# Patient Record
Sex: MALE | Race: WHITE | NOT HISPANIC OR LATINO | Employment: FULL TIME | URBAN - METROPOLITAN AREA
[De-identification: names, ages, dates, MRNs, and addresses within clinical notes are randomized per-mention and may not be internally consistent; named-entity substitution may affect disease eponyms.]

---
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
## 2018-01-02 ENCOUNTER — HOSPITAL ENCOUNTER (EMERGENCY)
Facility: HOSPITAL | Age: 18
Discharge: HOME | End: 2018-01-02
Attending: EMERGENCY MEDICINE

## 2018-01-02 VITALS
SYSTOLIC BLOOD PRESSURE: 134 MMHG | HEART RATE: 70 BPM | TEMPERATURE: 98.4 F | DIASTOLIC BLOOD PRESSURE: 90 MMHG | RESPIRATION RATE: 16 BRPM | OXYGEN SATURATION: 98 %

## 2018-01-02 DIAGNOSIS — K02.9 DENTAL CARIES: ICD-10-CM

## 2018-01-02 DIAGNOSIS — K04.7 DENTAL INFECTION: Primary | ICD-10-CM

## 2018-01-02 PROCEDURE — 99282 EMERGENCY DEPT VISIT SF MDM: CPT

## 2018-01-02 RX ORDER — IBUPROFEN 600 MG/1
600 TABLET ORAL EVERY 6 HOURS PRN
Qty: 30 TABLET | Refills: 0 | Status: SHIPPED | OUTPATIENT
Start: 2018-01-02 | End: 2018-12-13 | Stop reason: ALTCHOICE

## 2018-01-02 RX ORDER — PENICILLIN V POTASSIUM 500 MG/1
500 TABLET ORAL 4 TIMES DAILY
Qty: 40 TABLET | Refills: 0 | Status: SHIPPED | OUTPATIENT
Start: 2018-01-02 | End: 2018-01-12

## 2018-01-02 RX ORDER — PENICILLIN V POTASSIUM 250 MG/1
500 TABLET ORAL ONCE
Status: COMPLETED | OUTPATIENT
Start: 2018-01-02 | End: 2018-01-02

## 2018-01-02 RX ORDER — IBUPROFEN 600 MG/1
600 TABLET ORAL ONCE
Status: COMPLETED | OUTPATIENT
Start: 2018-01-02 | End: 2018-01-02

## 2018-01-02 RX ADMIN — IBUPROFEN 600 MG: 600 TABLET ORAL at 15:31

## 2018-01-02 RX ADMIN — PENICILLIN V POTASIUM 500 MG: 250 TABLET ORAL at 15:31

## 2018-01-02 NOTE — DISCHARGE INSTRUCTIONS
Dental Abscess   WHAT YOU NEED TO KNOW:   A dental abscess is a collection of pus in or around a tooth  A dental abscess is caused by bacteria  The bacteria usually enter the tooth when the enamel (outer part of the tooth) is damaged by tooth decay  Bacteria may also enter the tooth through a break or chip in the tooth, or a cut in the gum  Food particles that are stuck between the teeth for a long time may also lead to an abscess  DISCHARGE INSTRUCTIONS:   Return to the emergency department if:   · You have severe pain  · You have trouble breathing because of pain or swelling  Contact your healthcare provider if:   · Your symptoms get worse, even after treatment  · Your mouth is bleeding  · You cannot eat or drink because of pain or swelling  · Your abscess returns  · You have an injury that causes a crack in your tooth  · You have questions or concerns about your condition or care  Medicines: You may  need any of the following:  · Antibiotics  help treat a bacterial infection  · NSAIDs , such as ibuprofen, help decrease swelling, pain, and fever  This medicine is available with or without a doctor's order  NSAIDs can cause stomach bleeding or kidney problems in certain people  If you take blood thinner medicine, always ask your healthcare provider if NSAIDs are safe for you  Always read the medicine label and follow directions  · Acetaminophen  decreases pain and fever  It is available without a doctor's order  Ask how much to take and how often to take it  Follow directions  Read the labels of all other medicines you are using to see if they also contain acetaminophen, or ask your doctor or pharmacist  Acetaminophen can cause liver damage if not taken correctly  Do not use more than 4 grams (4,000 milligrams) total of acetaminophen in one day  · Prescription pain medicine  may be given  Ask your healthcare provider how to take this medicine safely   Some prescription pain medicines contain acetaminophen  Do not take other medicines that contain acetaminophen without talking to your healthcare provider  Too much acetaminophen may cause liver damage  Prescription pain medicine may cause constipation  Ask your healthcare provider how to prevent or treat constipation  · Take your medicine as directed  Contact your healthcare provider if you think your medicine is not helping or if you have side effects  Tell him of her if you are allergic to any medicine  Keep a list of the medicines, vitamins, and herbs you take  Include the amounts, and when and why you take them  Bring the list or the pill bottles to follow-up visits  Carry your medicine list with you in case of an emergency  Self-care:   · Rinse your mouth every 2 hours with salt water  This will help keep the area clean  · Gently brush your teeth twice a day with a soft tooth brush  This will help keep the area clean  · Eat soft foods as directed  Soft foods may cause less pain  Examples include applesauce, yogurt, and cooked pasta  Ask your healthcare provider how long to follow this instruction  · Apply a warm compress to your tooth or gum  Use a cotton ball or gauze soaked in warm water  Remove the compress in 10 minutes or when it becomes cool  Repeat 3 times a day  Prevent another abscess:   · Brush your teeth at least 2 times a day with fluoride toothpaste  · Use dental floss to clean between your teeth at least once a day  · Rinse your mouth with water or mouthwash after meals and snacks  · Chew sugarless gum after meals and snacks  · Limit foods that are sticky and high in sugar such as raisons  Also limit drinks high in sugar, such as soda  · See your dentist every 6 months for dental cleanings and oral exams  Follow up with your healthcare provider in 24 hours: Your healthcare provider will need to check your teeth and gums   Write down your questions so you remember to ask them during your visits  © 2017 [address] Information is for End User's use only and may not be sold, redistributed or otherwise used for commercial purposes  All illustrations and images included in CareNotes® are the copyrighted property of [employer]  or [employer]  The above information is an  only  It is not intended as medical advice for individual conditions or treatments  Talk to your doctor, nurse or pharmacist before following any medical regimen to see if it is safe and effective for you  Dental Caries   WHAT YOU NEED TO KNOW:   Dental caries are also called cavities  Cavities are caused by bacteria  When the bacteria in tooth plaque (sticky film) mix with certain types of carbohydrate, this creates acid  The acid breaks down areas of enamel, which covers the outside of a tooth  This creates a small hole in the tooth called a cavity  DISCHARGE INSTRUCTIONS:   Return to the emergency department if:   · Your face, jaw, cheek, eye, or neck begin to swell  Contact your dentist if:   · You have a fever  · Your tooth pain gets worse  · You have questions or concerns about your condition or care  Follow up with your dentist as directed:  Write down your questions so you remember to ask them during your visits  Prevent dental caries:   · Brush your teeth at least 2 times a day with fluoride toothpaste  · Use dental floss to clean between your teeth at least once a day  · Rinse your mouth with water or mouthwash after meals and snacks  · Chew sugarless gum after meals and snacks  · See your dentist regularly for dental cleanings and oral exams  © 2017 [address] is for End User's use only and may not be sold, redistributed or otherwise used for commercial purposes  All illustrations and images included in CareNotes® are the copyrighted property of [employer]  or [employer]    The above information is an  only  It is not intended as medical advice for individual conditions or treatments  Talk to your doctor, nurse or pharmacist before following any medical regimen to see if it is safe and effective for you

## 2018-01-02 NOTE — ED PROVIDER NOTES
History  Chief Complaint   Patient presents with    Dental Swelling     Pt presents with facial swelling from dental issue, pt with issues with lower R teeth, pt denies pain     61-year-old male presents today complaining of right lower dental pain and facial swelling for 2 days  States I know it's my teeth there all right  Has not seen a dentist   Has not taken anything for pain  Denies fevers  Is having no trouble swallowing or handling secretions  History provided by:  Patient  Dental Pain   Location:  Lower  Quality:  Aching  Severity:  Moderate  Onset quality:  Gradual  Duration:  3 days  Timing:  Constant  Progression:  Worsening  Chronicity:  New  Context: dental caries    Relieved by:  None tried  Worsened by:  Nothing  Ineffective treatments:  None tried  Associated symptoms: facial swelling and gum swelling    Associated symptoms: no congestion, no difficulty swallowing, no drooling, no facial pain, no fever, no headaches, no neck pain, no neck swelling, no oral bleeding, no oral lesions and no trismus    Risk factors: lack of dental care and smoking    Risk factors: no alcohol problem, no cancer, no chewing tobacco use, no diabetes, no immunosuppression and no periodontal disease        Prior to Admission Medications   Prescriptions Last Dose Informant Patient Reported? Taking? morphine (MSIR) 30 MG tablet   No No   Sig: Take 1 tablet by mouth every 4 (four) hours as needed for severe pain for up to 5 doses Max Daily Amount: 180 mg      Facility-Administered Medications: None       Past Medical History:   Diagnosis Date    Asthma        Past Surgical History:   Procedure Laterality Date    CIRCUMCISION  2010       History reviewed  No pertinent family history  I have reviewed and agree with the history as documented      Social History   Substance Use Topics    Smoking status: Current Every Day Smoker     Packs/day: 0 20     Types: Cigarettes    Smokeless tobacco: Never Used   Narinder Brookings Alcohol use No        Review of Systems   Constitutional: Negative for fever  HENT: Positive for facial swelling  Negative for congestion, drooling and mouth sores  Musculoskeletal: Negative for neck pain  Skin: Negative for pallor, rash and wound  Neurological: Negative for headaches  Physical Exam  ED Triage Vitals [01/02/18 1418]   Temperature Pulse Respirations Blood Pressure SpO2   98 4 °F (36 9 °C) 69 18 136/64 100 %      Temp Source Heart Rate Source Patient Position - Orthostatic VS BP Location FiO2 (%)   Oral Monitor Sitting Left arm --      Pain Score       No Pain           Orthostatic Vital Signs  Vitals:    01/02/18 1418 01/02/18 1532   BP: 136/64 134/90   Pulse: 69 70   Patient Position - Orthostatic VS: Sitting Sitting       Physical Exam   Constitutional: He is oriented to person, place, and time  He appears well-developed and well-nourished  HENT:   Head: Normocephalic and atraumatic  Mouth/Throat: No uvula swelling  Extensive severe dental decay  Mild swelling adjacent to the left lower incisors on the buccal mucosa  No fluctuance c/w distinct abscess  Eyes: EOM are normal  Pupils are equal, round, and reactive to light  Neck: Normal range of motion  Neurological: He is alert and oriented to person, place, and time  Psychiatric: He has a normal mood and affect  Nursing note and vitals reviewed        ED Medications  Medications   penicillin V potassium (VEETID) tablet 500 mg (500 mg Oral Given 1/2/18 1531)   ibuprofen (MOTRIN) tablet 600 mg (600 mg Oral Given 1/2/18 1531)       Diagnostic Studies  Results Reviewed     None                 No orders to display              Procedures  Procedures       Phone Contacts  ED Phone Contact    ED Course  ED Course                                MDM  Number of Diagnoses or Management Options  Dental caries: new and does not require workup  Dental infection: new and does not require workup  Risk of Complications, Morbidity, and/or Mortality  Presenting problems: low  Diagnostic procedures: low  Management options: low    Patient Progress  Patient progress: stable    CritCare Time    Disposition  Final diagnoses:   Dental infection   Dental caries     Time reflects when diagnosis was documented in both MDM as applicable and the Disposition within this note     Time User Action Codes Description Comment    1/2/2018  3:16 PM Harjit Balderas Add [K04 7] Dental infection     1/2/2018  3:17 PM [place], [address] [place] [address] Road [K02 9] Dental caries       ED Disposition     ED Disposition Condition Comment    Discharge  [address] [facility] discharge to home/self care  Condition at discharge: Stable        Follow-up Information     Follow up With Specialties Details Why Contact Info    Randee Bower MD Pediatrics Schedule an appointment as soon as possible for a visit  [address] [phone]      [facility] Adult and [address] [facility]  Schedule an appointment as soon as possible for a visit  [address]  [address]  [phone]        Patient's Medications   Discharge Prescriptions    IBUPROFEN (MOTRIN) 600 MG TABLET    Take 1 tablet by mouth every 6 (six) hours as needed for mild pain       Start Date: 1/2/2018  End Date: --       Order Dose: 600 mg       Quantity: 30 tablet    Refills: 0    PENICILLIN V POTASSIUM (VEETID) 500 MG TABLET    Take 1 tablet by mouth 4 (four) times a day for 10 days       Start Date: 1/2/2018  End Date: 1/12/2018       Order Dose: 500 mg       Quantity: 40 tablet    Refills: 0     No discharge procedures on file      ED Provider  Electronically Signed by           Brian Conroy DO  01/02/18 1981

## 2018-01-02 NOTE — ED NOTES
D/c by SHNATI Whitmore MD  No s/s of acute distress off unit        Mignon Sandoval, HUSEYIN  01/02/18 0713

## 2018-01-16 ENCOUNTER — HOSPITAL ENCOUNTER (EMERGENCY)
Facility: HOSPITAL | Age: 18
Discharge: HOME | End: 2018-01-16
Attending: EMERGENCY MEDICINE

## 2018-01-16 VITALS
OXYGEN SATURATION: 99 % | DIASTOLIC BLOOD PRESSURE: 68 MMHG | HEART RATE: 65 BPM | RESPIRATION RATE: 20 BRPM | TEMPERATURE: 97.9 F | SYSTOLIC BLOOD PRESSURE: 136 MMHG

## 2018-01-16 DIAGNOSIS — R22.0 JAW SWELLING: Primary | ICD-10-CM

## 2018-01-16 PROCEDURE — 99283 EMERGENCY DEPT VISIT LOW MDM: CPT

## 2018-01-16 RX ORDER — NAPROXEN 500 MG/1
500 TABLET ORAL 2 TIMES DAILY WITH MEALS
Qty: 20 TABLET | Refills: 0 | Status: SHIPPED | OUTPATIENT
Start: 2018-01-16 | End: 2018-12-13 | Stop reason: ALTCHOICE

## 2018-01-16 RX ORDER — CLOVE OIL
OIL (ML) MISCELLANEOUS AS NEEDED
Qty: 3.5 ML | Refills: 0 | Status: SHIPPED | OUTPATIENT
Start: 2018-01-16 | End: 2018-12-13 | Stop reason: ALTCHOICE

## 2018-01-17 NOTE — ED PROVIDER NOTES
History  Chief Complaint   Patient presents with    Facial Swelling     Pt presents to the Ed for evaluation of right sided facial swelling, reports right sided tooth infection, pt unable to get in to dental clinic till march     Patient is a 68-year-old male with recent medical history of dental abscess presenting to the emergency department for evaluation of jaw swelling  Pt states he was seen about 2 weeks ago for a diagnosed dental abscess  HE was discharged on antibiotics and ibuprofen for analgesia  States the dental and facial pain has resolved  States that the facial swelling initially improved but has begun to return over the past 2 days  No facial or dental pain associated with it  No throat swelling or difficulty swallowing  States he attempted to call a dental clinic and they are unable to get him in before February  Pt states he wants to be evaluated to make sure the swelling isnt another infecitona nd if he could be provided with additional dental clinic information  No fever/chills/night sweats  No posterior neck pain or submandibular neck pain            Prior to Admission Medications   Prescriptions Last Dose Informant Patient Reported? Taking?   ibuprofen (MOTRIN) 600 mg tablet   No No   Sig: Take 1 tablet by mouth every 6 (six) hours as needed for mild pain   morphine (MSIR) 30 MG tablet   No No   Sig: Take 1 tablet by mouth every 4 (four) hours as needed for severe pain for up to 5 doses Max Daily Amount: 180 mg      Facility-Administered Medications: None       Past Medical History:   Diagnosis Date    Asthma        Past Surgical History:   Procedure Laterality Date    CIRCUMCISION  2010       History reviewed  No pertinent family history  I have reviewed and agree with the history as documented      Social History   Substance Use Topics    Smoking status: Current Every Day Smoker     Packs/day: 0 20     Types: Cigarettes    Smokeless tobacco: Never Used    Alcohol use No        Review of Systems   Constitutional: Negative for activity change, appetite change, chills, fatigue and fever  HENT: Positive for facial swelling  Negative for ear pain, sneezing and sore throat  Eyes: Negative for pain and visual disturbance  Respiratory: Negative for cough and shortness of breath  Cardiovascular: Negative for chest pain and palpitations  Gastrointestinal: Negative for abdominal pain, blood in stool, constipation, diarrhea, nausea and vomiting  Genitourinary: Negative for dysuria and hematuria  Musculoskeletal: Negative for arthralgias, neck pain and neck stiffness  Skin: Negative for rash and wound  Neurological: Negative for dizziness, weakness, light-headedness, numbness and headaches  All other systems reviewed and are negative  Physical Exam  ED Triage Vitals [01/16/18 1931]   Temperature Pulse Respirations Blood Pressure SpO2   97 9 °F (36 6 °C) 65 20 136/68 99 %      Temp Source Heart Rate Source Patient Position - Orthostatic VS BP Location FiO2 (%)   Oral Monitor Sitting Left arm --      Pain Score       No Pain           Orthostatic Vital Signs  Vitals:    01/16/18 1931   BP: 136/68   Pulse: 65   Patient Position - Orthostatic VS: Sitting       Physical Exam   Constitutional: He is oriented to person, place, and time  He appears well-developed and well-nourished  No distress  HENT:   Head: Normocephalic and atraumatic  Nose: Nose normal    External Swelling noted on anterior aspect of right lower jaw, near the first bicuspid  Nontender to palpation  No erythema noted  Posterior right three teeth are fracture and eroded done to the gingiva  Surrounding gingival erythema but nontender to palpation  Posterior oropharynx clear, no swelling  No posterior neck pain  No cervical LAD  Eyes: Conjunctivae and EOM are normal  Pupils are equal, round, and reactive to light  Neck: Normal range of motion  Neck supple     Cardiovascular: Normal rate, regular rhythm, normal heart sounds and intact distal pulses  Exam reveals no gallop and no friction rub  No murmur heard  Pulmonary/Chest: Effort normal and breath sounds normal  No respiratory distress  He has no wheezes  He has no rales  Abdominal: Soft  He exhibits no distension  There is no tenderness  Musculoskeletal: Normal range of motion  He exhibits no edema, tenderness or deformity  Lymphadenopathy:     He has no cervical adenopathy  Neurological: He is alert and oriented to person, place, and time  Skin: Skin is warm and dry  Capillary refill takes less than 2 seconds  He is not diaphoretic  No erythema  No pallor  Nursing note and vitals reviewed  ED Medications  Medications - No data to display    Diagnostic Studies  Results Reviewed     None                 No orders to display              Procedures  Procedures       Phone Contacts  ED Phone Contact    ED Course  ED Course                                MDM  Number of Diagnoses or Management Options  Jaw swelling: new and does not require workup  Diagnosis management comments: Pt presenting to the ED for evaluation of jaw swelling  Pt recently seen and diagnosed with dental abscess, placed on pen vk  Patient states that pain has resolved, however facial swelling returned 2 days ago  No pain associated with swelling  Swelling localized to jaw  I believe this swelling is secondary to fractured teeth and surrounding inflammation of gingiva  No signs of dental abscess at this time, patient denies pain  Patient declining dental block for pain, I believe patient is presenting for another referral to a different dental clinic as his appointment is not until next month  Will provide adela give patient strict return precautions for abscess symptoms       Risk of Complications, Morbidity, and/or Mortality  Presenting problems: low  Diagnostic procedures: minimal  Management options: minimal    Patient Progress  Patient progress: stable    CritCare Time    Disposition  Final diagnoses:   Jaw swelling     Time reflects when diagnosis was documented in both MDM as applicable and the Disposition within this note     Time User Action Codes Description Comment    1/16/2018 10:10 PM Dipika Millard Add [R22 0] Jaw swelling       ED Disposition     ED Disposition Condition Comment    Discharge  Scooter Bradley Repruddy discharge to home/self care  Condition at discharge: Stable        Follow-up Information     Follow up With Specialties Details Why [address] [place] [address]    [address] [facility] [address] [address]  [address]    [address]  [address] [place] [address], [place]    [address] [facility] Adult and [address] [facility] [address]    [phone] [facility] Emergency Department Emergency Medicine  If symptoms worsen [address] [facility] [address],  [address], [place], [place], [address]        Discharge Medication List as of 1/16/2018 10:40 PM      START taking these medications    Details   clove oil Apply topically as needed for mucositis, Starting Tue 1/16/2018, Print      naproxen (NAPROSYN) 500 mg tablet Take 1 tablet by mouth 2 (two) times a day with meals, Starting Tue 1/16/2018, Print         CONTINUE these medications which have NOT CHANGED    Details   ibuprofen (MOTRIN) 600 mg tablet Take 1 tablet by mouth every 6 (six) hours as needed for mild pain, Starting Tue 1/2/2018, Print      morphine (MSIR) 30 MG tablet Take 1 tablet by mouth every 4 (four) hours as needed for severe pain for up to 5 doses Max Daily Amount: 180 mg, Starting Fri 10/27/2017, Print           No discharge procedures on file      ED Provider  Electronically Signed by           Shell Midldeton PA-C  01/17/18 0466

## 2018-01-17 NOTE — DISCHARGE INSTRUCTIONS
Dental Abscess   WHAT YOU NEED TO KNOW:   A dental abscess is a collection of pus in or around a tooth  A dental abscess is caused by bacteria  The bacteria usually enter the tooth when the enamel (outer part of the tooth) is damaged by tooth decay  Bacteria may also enter the tooth through a break or chip in the tooth, or a cut in the gum  Food particles that are stuck between the teeth for a long time may also lead to an abscess  DISCHARGE INSTRUCTIONS:   Return to the emergency department if:   · You have severe pain  · You have trouble breathing because of pain or swelling  Contact your healthcare provider if:   · Your symptoms get worse, even after treatment  · Your mouth is bleeding  · You cannot eat or drink because of pain or swelling  · Your abscess returns  · You have an injury that causes a crack in your tooth  · You have questions or concerns about your condition or care  Medicines: You may  need any of the following:  · Antibiotics  help treat a bacterial infection  · NSAIDs , such as ibuprofen, help decrease swelling, pain, and fever  This medicine is available with or without a doctor's order  NSAIDs can cause stomach bleeding or kidney problems in certain people  If you take blood thinner medicine, always ask your healthcare provider if NSAIDs are safe for you  Always read the medicine label and follow directions  · Acetaminophen  decreases pain and fever  It is available without a doctor's order  Ask how much to take and how often to take it  Follow directions  Read the labels of all other medicines you are using to see if they also contain acetaminophen, or ask your doctor or pharmacist  Acetaminophen can cause liver damage if not taken correctly  Do not use more than 4 grams (4,000 milligrams) total of acetaminophen in one day  · Prescription pain medicine  may be given  Ask your healthcare provider how to take this medicine safely   Some prescription pain medicines contain acetaminophen  Do not take other medicines that contain acetaminophen without talking to your healthcare provider  Too much acetaminophen may cause liver damage  Prescription pain medicine may cause constipation  Ask your healthcare provider how to prevent or treat constipation  · Take your medicine as directed  Contact your healthcare provider if you think your medicine is not helping or if you have side effects  Tell him of her if you are allergic to any medicine  Keep a list of the medicines, vitamins, and herbs you take  Include the amounts, and when and why you take them  Bring the list or the pill bottles to follow-up visits  Carry your medicine list with you in case of an emergency  Self-care:   · Rinse your mouth every 2 hours with salt water  This will help keep the area clean  · Gently brush your teeth twice a day with a soft tooth brush  This will help keep the area clean  · Eat soft foods as directed  Soft foods may cause less pain  Examples include applesauce, yogurt, and cooked pasta  Ask your healthcare provider how long to follow this instruction  · Apply a warm compress to your tooth or gum  Use a cotton ball or gauze soaked in warm water  Remove the compress in 10 minutes or when it becomes cool  Repeat 3 times a day  Prevent another abscess:   · Brush your teeth at least 2 times a day with fluoride toothpaste  · Use dental floss to clean between your teeth at least once a day  · Rinse your mouth with water or mouthwash after meals and snacks  · Chew sugarless gum after meals and snacks  · Limit foods that are sticky and high in sugar such as raisons  Also limit drinks high in sugar, such as soda  · See your dentist every 6 months for dental cleanings and oral exams  Follow up with your healthcare provider in 24 hours: Your healthcare provider will need to check your teeth and gums   Write down your questions so you remember to ask them during your visits  © 2017 [facility] Information is for End User's use only and may not be sold, redistributed or otherwise used for commercial purposes  All illustrations and images included in CareNotes® are the copyrighted property of A D A M [employer] Inc  or Delfino Lua  The above information is an  only  It is not intended as medical advice for individual conditions or treatments  Talk to your doctor, nurse or pharmacist before following any medical regimen to see if it is safe and effective for you

## 2018-12-06 ENCOUNTER — APPOINTMENT (OUTPATIENT)
Dept: RADIOLOGY | Facility: CLINIC | Age: 18
End: 2018-12-06
Payer: OTHER MISCELLANEOUS

## 2018-12-06 ENCOUNTER — OFFICE VISIT (OUTPATIENT)
Dept: OBGYN CLINIC | Facility: CLINIC | Age: 18
End: 2018-12-06
Payer: OTHER MISCELLANEOUS

## 2018-12-06 VITALS
WEIGHT: 254 LBS | DIASTOLIC BLOOD PRESSURE: 82 MMHG | HEART RATE: 69 BPM | BODY MASS INDEX: 34.4 KG/M2 | HEIGHT: 72 IN | SYSTOLIC BLOOD PRESSURE: 135 MMHG

## 2018-12-06 DIAGNOSIS — M79.644 PAIN OF FINGER OF RIGHT HAND: ICD-10-CM

## 2018-12-06 DIAGNOSIS — M79.644 PAIN OF FINGER OF RIGHT HAND: Primary | ICD-10-CM

## 2018-12-06 DIAGNOSIS — S62.314S: ICD-10-CM

## 2018-12-06 PROCEDURE — 73130 X-RAY EXAM OF HAND: CPT

## 2018-12-06 PROCEDURE — 29125 APPL SHORT ARM SPLINT STATIC: CPT | Performed by: SURGERY

## 2018-12-06 PROCEDURE — 99203 OFFICE O/P NEW LOW 30 MIN: CPT | Performed by: SURGERY

## 2018-12-06 PROCEDURE — 29075 APPL CST ELBW FNGR SHORT ARM: CPT | Performed by: SURGERY

## 2018-12-06 NOTE — H&P
Rosalva LINO  Attending, Orthopaedic Surgery  Hand, Wrist, and Elbow Surgery  [facility]      ORTHOPAEDIC HAND, WRIST, AND ELBOW OFFICE  VISIT       ASSESSMENT/PLAN:      Patient will be scheduled for right 4th finger metacarpal open reduction  Patient was placed in a splint today  Follow up PO     The patient verbalized understanding of exam findings and treatment plan  We engaged in the shared decision-making process and treatment options were discussed at length with the patient  Surgical and conservative management discussed today along with risks and benefits  Diagnoses and all orders for this visit:    Pain of finger of right hand  -     Cancel: XR finger right fourth digit-ring; Future    Displaced fracture of base of fourth metacarpal bone, right hand, sequela  -     Splint application  -     Case request operating room: OPEN REDUCTION W/ INTERNAL FIXATION (ORIF) HAND, 4th metacarpal; Standing  -     Case request operating room: OPEN REDUCTION W/ INTERNAL FIXATION (ORIF) HAND, 4th metacarpal    Other orders  -     Void on call to OR; Standing  -     Insert peripheral IV; Standing  -     Diet NPO; Sips with meds; Standing        Follow Up:  No Follow-up on file  To Do Next Visit:  Re-evaluation of current issue and X-rays of the  right  hand      General Discussions:      Operative Discussions:  Fracture Operative Treatment: The physiology of a fractured bone was discussed with the patient today  With displaced fractures, operative treatment often results in a functional recovery  Typically, these fractures undergo reduction either through percutaneous or open methods depending on the location and fracture pattern  Radiographs are typically taken at intervals throughout the fracture healing ensure maintenance of reduction and alignment  If the fracture loses its alignment, revision surgery may be required    Medical conditions such as diabetes, osteoporosis, vitamin D deficiency, and a history of or exposure to smoking may delay or prevent fracture healing  The risks and benefits of the procedure were explained to the patient, which include, but are not limited to: Bleeding, infection, recurrence, pain, scar, malunion, nonunion, damage to tendons, damage to nerves, and damage to blood vessels, and complications related to anesthesia, failure to give desire result, need for more surgery  These risks, along with alternative conservative treatment options, and postoperative protocols were voiced back and understood by the patient  All questions were answered to the patient's satisfaction  The patient agrees to comply with a standard postoperative protocol, and is willing to proceed  Education was provided via written and auditory forms  There were no barriers to learning  Written handouts regarding wound care, incision and scar care, and general preoperative information was provided to the patient  Prior to surgery, the patient may be requested to stop all anti-inflammatory medications  Prophylactic aspirin, Plavix, and Coumadin may be allowed to be continued  Medications including vitamin E , ginkgo, and fish oil are requested to be stopped approximately one week prior to surgery  Hypertensive medications and beta blockers, if taken, should be continued  ____________________________________________________________________________________________________________________________________________      CHIEF COMPLAINT:  Chief Complaint   Patient presents with    Right Hand - Pain       SUBJECTIVE:  Juanita Coreas is a 19y o  year old RHD male who presents right hand pain  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 , and he removing metal sheet and when he was pushing the metal sheet his right ring  finger got stuck in the netting and the finger went  to the right   He notes he went to urgent care and was prescribed anti inflammatories and was placed in a splint and ace wrap  He notes there is some mild swelling,  pain in the right ring finger with movement  He denies any numbness tingling  He is currently not taking any medications  Pain/symptom timing:  Worse during the day when active  Pain/symptom context:  Worse with activites and work  Pain/symptom modifying factors:  Rest makes better, activities make worse  Pain/symptom associated signs/symptoms: none    Prior treatment   · NSAIDsYes   · Injections Yes and Not Asked   · Bracing/Orthotics Yes    Physical Therapy Yes     I have personally reviewed all the relevant PMH, PSH, SH, FH, Medications and allergies      PAST MEDICAL HISTORY:  Past Medical History:   Diagnosis Date    Asthma        PAST SURGICAL HISTORY:  Past Surgical History:   Procedure Laterality Date    CIRCUMCISION  2010       FAMILY HISTORY:  History reviewed  No pertinent family history  SOCIAL HISTORY:  Social History   Substance Use Topics    Smoking status: Current Every Day Smoker     Packs/day: 0 20     Types: Cigarettes    Smokeless tobacco: Never Used    Alcohol use No       MEDICATIONS:    Current Outpatient Prescriptions:     clove oil, Apply topically as needed for mucositis, Disp: 3 5 mL, Rfl: 0    ibuprofen (MOTRIN) 600 mg tablet, Take 1 tablet by mouth every 6 (six) hours as needed for mild pain, Disp: 30 tablet, Rfl: 0    morphine (MSIR) 30 MG tablet, Take 1 tablet by mouth every 4 (four) hours as needed for severe pain for up to 5 doses Max Daily Amount: 180 mg, Disp: 5 tablet, Rfl: 0    naproxen (NAPROSYN) 500 mg tablet, Take 1 tablet by mouth 2 (two) times a day with meals (Patient not taking: Reported on 12/6/2018 ), Disp: 20 tablet, Rfl: 0    ALLERGIES:  Allergies   Allergen Reactions    Seasonal Ic  [Cholestatin]            REVIEW OF SYSTEMS:  Review of Systems   Constitutional: Negative for activity change, chills, fever and unexpected weight change     HENT: Negative for trouble swallowing and voice change  Eyes: Negative for pain and visual disturbance  Respiratory: Negative for shortness of breath and wheezing  Cardiovascular: Negative for chest pain, palpitations and leg swelling  Gastrointestinal: Negative for abdominal pain, nausea and vomiting  Endocrine: Negative for polydipsia and polyuria  Genitourinary: Negative for dysuria and hematuria  Musculoskeletal: Positive for arthralgias  Negative for myalgias  Skin: Negative for rash and wound  Neurological: Negative for dizziness, numbness and headaches  Psychiatric/Behavioral: Negative for decreased concentration and suicidal ideas  The patient is not nervous/anxious  VITALS:  Vitals:    12/06/18 0852   BP: 135/82   Pulse: 69       LABS:  HgA1c: No results found for: HGBA1C  BMP: No results found for: GLUCOSE, CALCIUM, NA, K, CO2, CL, BUN, CREATININE    _____________________________________________________  PHYSICAL EXAMINATION:  General: well developed and well nourished, alert, oriented times 3 and appears comfortable  Psychiatric: Normal  HEENT: Normocephalic, Atraumatic Trachea Midline, No torticollis  Pulmonary: No audible wheezing or respiratory distress   Cardiovascular: No pitting edema, 2+ radial pulse   Skin: No masses, erthema, lacerations, fluctation, ulcerations  Neurovascular: Sensation Intact to the Median, Ulnar, Radial Nerve, Motor Intact to the Median, Ulnar, Radial Nerve and Pulses Intact  Musculoskeletal: Normal, except as noted in detailed exam and in HPI        MUSCULOSKELETAL EXAMINATION:  Right hand  Mild swelling   For shortened metacarpal  Extension lag at the MP joint of approximately 20°  Able to make a fist    Tenderness palpation at fracture site no malrotation noted    ___________________________________________________  STUDIES REVIEWED:  I have personally reviewed AP lateral and oblique radiographs of right hand   which demonstrates oblique fracture 4th metacarpal metaphysis extending into the base          PROCEDURES PERFORMED:  Splint application  Date/Time: 12/6/2018 9:43 AM  Performed by: Raquel Garcia by: Surendra Arnold     Consent:     Consent obtained:  Verbal    Consent given by:  Patient  Procedure details:     Laterality:  Right    Location:  Hand    Hand:  R hand    Strapping: no  Cast type:  Short arm    Splint type:  Short arm splint, static (forearm to hand)    Supplies:  Cotton padding, plaster and elastic bandage          _____________________________________________________      Crista Stauffer    I,:   Sherly Flores am acting as a scribe while in the presence of the attending physician :        I,:   Monae Zuleta MD personally performed the services described in this documentation    as scribed in my presence :                    Attestation signed by Monae Zuleta MD at 12/6/2018 11:36 AM:  I saw and examined the patient personally and agree with my physician extender's note and plan   I formulated  the plan and gave  explicit instructions to the patient

## 2018-12-06 NOTE — PROGRESS NOTES
Tania LINO  Attending, Orthopaedic Surgery  Hand, Wrist, and Elbow Surgery  [facility]      ORTHOPAEDIC HAND, WRIST, AND ELBOW OFFICE  VISIT       ASSESSMENT/PLAN:      Patient will be scheduled for right 4th finger metacarpal open reduction  Patient was placed in a splint today  Follow up PO     The patient verbalized understanding of exam findings and treatment plan  We engaged in the shared decision-making process and treatment options were discussed at length with the patient  Surgical and conservative management discussed today along with risks and benefits  Diagnoses and all orders for this visit:    Pain of finger of right hand  -     Cancel: XR finger right fourth digit-ring; Future    Displaced fracture of base of fourth metacarpal bone, right hand, sequela  -     Splint application  -     Case request operating room: OPEN REDUCTION W/ INTERNAL FIXATION (ORIF) HAND, 4th metacarpal; Standing  -     Case request operating room: OPEN REDUCTION W/ INTERNAL FIXATION (ORIF) HAND, 4th metacarpal    Other orders  -     Void on call to OR; Standing  -     Insert peripheral IV; Standing  -     Diet NPO; Sips with meds; Standing        Follow Up:  No Follow-up on file  To Do Next Visit:  Re-evaluation of current issue and X-rays of the  right  hand      General Discussions:      Operative Discussions:  Fracture Operative Treatment: The physiology of a fractured bone was discussed with the patient today  With displaced fractures, operative treatment often results in a functional recovery  Typically, these fractures undergo reduction either through percutaneous or open methods depending on the location and fracture pattern  Radiographs are typically taken at intervals throughout the fracture healing ensure maintenance of reduction and alignment  If the fracture loses its alignment, revision surgery may be required    Medical conditions such as diabetes, osteoporosis, vitamin D deficiency, and a history of or exposure to smoking may delay or prevent fracture healing  The risks and benefits of the procedure were explained to the patient, which include, but are not limited to: Bleeding, infection, recurrence, pain, scar, malunion, nonunion, damage to tendons, damage to nerves, and damage to blood vessels, and complications related to anesthesia, failure to give desire result, need for more surgery  These risks, along with alternative conservative treatment options, and postoperative protocols were voiced back and understood by the patient  All questions were answered to the patient's satisfaction  The patient agrees to comply with a standard postoperative protocol, and is willing to proceed  Education was provided via written and auditory forms  There were no barriers to learning  Written handouts regarding wound care, incision and scar care, and general preoperative information was provided to the patient  Prior to surgery, the patient may be requested to stop all anti-inflammatory medications  Prophylactic aspirin, Plavix, and Coumadin may be allowed to be continued  Medications including vitamin E , ginkgo, and fish oil are requested to be stopped approximately one week prior to surgery  Hypertensive medications and beta blockers, if taken, should be continued  ____________________________________________________________________________________________________________________________________________      CHIEF COMPLAINT:  Chief Complaint   Patient presents with    Right Hand - Pain       SUBJECTIVE:  Jona Alberts is a 19y o  year old RHD male who presents right hand pain  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 , and he removing metal sheet and when he was pushing the metal sheet his right ring  finger got stuck in the netting and the finger went  to the right   He notes he went to urgent care and was prescribed anti inflammatories and was placed in a splint and ace wrap  He notes there is some mild swelling,  pain in the right ring finger with movement  He denies any numbness tingling  He is currently not taking any medications  Pain/symptom timing:  Worse during the day when active  Pain/symptom context:  Worse with activites and work  Pain/symptom modifying factors:  Rest makes better, activities make worse  Pain/symptom associated signs/symptoms: none    Prior treatment   · NSAIDsYes   · Injections Yes and Not Asked   · Bracing/Orthotics Yes    Physical Therapy Yes     I have personally reviewed all the relevant PMH, PSH, SH, FH, Medications and allergies      PAST MEDICAL HISTORY:  Past Medical History:   Diagnosis Date    Asthma        PAST SURGICAL HISTORY:  Past Surgical History:   Procedure Laterality Date    CIRCUMCISION  2010       FAMILY HISTORY:  History reviewed  No pertinent family history  SOCIAL HISTORY:  Social History   Substance Use Topics    Smoking status: Current Every Day Smoker     Packs/day: 0 20     Types: Cigarettes    Smokeless tobacco: Never Used    Alcohol use No       MEDICATIONS:    Current Outpatient Prescriptions:     clove oil, Apply topically as needed for mucositis, Disp: 3 5 mL, Rfl: 0    ibuprofen (MOTRIN) 600 mg tablet, Take 1 tablet by mouth every 6 (six) hours as needed for mild pain, Disp: 30 tablet, Rfl: 0    morphine (MSIR) 30 MG tablet, Take 1 tablet by mouth every 4 (four) hours as needed for severe pain for up to 5 doses Max Daily Amount: 180 mg, Disp: 5 tablet, Rfl: 0    naproxen (NAPROSYN) 500 mg tablet, Take 1 tablet by mouth 2 (two) times a day with meals (Patient not taking: Reported on 12/6/2018 ), Disp: 20 tablet, Rfl: 0    ALLERGIES:  Allergies   Allergen Reactions    Seasonal Ic  [Cholestatin]            REVIEW OF SYSTEMS:  Review of Systems   Constitutional: Negative for activity change, chills, fever and unexpected weight change     HENT: Negative for trouble swallowing and voice change  Eyes: Negative for pain and visual disturbance  Respiratory: Negative for shortness of breath and wheezing  Cardiovascular: Negative for chest pain, palpitations and leg swelling  Gastrointestinal: Negative for abdominal pain, nausea and vomiting  Endocrine: Negative for polydipsia and polyuria  Genitourinary: Negative for dysuria and hematuria  Musculoskeletal: Positive for arthralgias  Negative for myalgias  Skin: Negative for rash and wound  Neurological: Negative for dizziness, numbness and headaches  Psychiatric/Behavioral: Negative for decreased concentration and suicidal ideas  The patient is not nervous/anxious  VITALS:  Vitals:    12/06/18 0852   BP: 135/82   Pulse: 69       LABS:  HgA1c: No results found for: HGBA1C  BMP: No results found for: GLUCOSE, CALCIUM, NA, K, CO2, CL, BUN, CREATININE    _____________________________________________________  PHYSICAL EXAMINATION:  General: well developed and well nourished, alert, oriented times 3 and appears comfortable  Psychiatric: Normal  HEENT: Normocephalic, Atraumatic Trachea Midline, No torticollis  Pulmonary: No audible wheezing or respiratory distress   Cardiovascular: No pitting edema, 2+ radial pulse   Skin: No masses, erthema, lacerations, fluctation, ulcerations  Neurovascular: Sensation Intact to the Median, Ulnar, Radial Nerve, Motor Intact to the Median, Ulnar, Radial Nerve and Pulses Intact  Musculoskeletal: Normal, except as noted in detailed exam and in HPI        MUSCULOSKELETAL EXAMINATION:  Right hand  Mild swelling   For shortened metacarpal  Extension lag at the MP joint of approximately 20°  Able to make a fist    Tenderness palpation at fracture site no malrotation noted    ___________________________________________________  STUDIES REVIEWED:  I have personally reviewed AP lateral and oblique radiographs of right hand   which demonstrates oblique fracture 4th metacarpal metaphysis extending into the base          PROCEDURES PERFORMED:  Splint application  Date/Time: 12/6/2018 9:43 AM  Performed by: Myrna Valderrama by: Shayy Ruvalcaba     Consent:     Consent obtained:  Verbal    Consent given by:  Patient  Procedure details:     Laterality:  Right    Location:  Hand    Hand:  R hand    Strapping: no  Cast type:  Short arm    Splint type:  Short arm splint, static (forearm to hand)    Supplies:  Cotton padding, plaster and elastic bandage          _____________________________________________________      Mona Harris    I,:   Yani Weber am acting as a scribe while in the presence of the attending physician :        I,:   Bull Oconnor MD personally performed the services described in this documentation    as scribed in my presence :

## 2018-12-13 ENCOUNTER — ANESTHESIA EVENT (OUTPATIENT)
Dept: PERIOP | Facility: HOSPITAL | Age: 18
End: 2018-12-13
Payer: OTHER MISCELLANEOUS

## 2018-12-13 DIAGNOSIS — S62.314S: Primary | ICD-10-CM

## 2018-12-13 RX ORDER — HYDROCODONE BITARTRATE AND ACETAMINOPHEN 5; 325 MG/1; MG/1
1 TABLET ORAL EVERY 6 HOURS PRN
Qty: 16 TABLET | Refills: 0 | Status: SHIPPED | OUTPATIENT
Start: 2018-12-13

## 2018-12-13 NOTE — ANESTHESIA PREPROCEDURE EVALUATION
Review of Systems/Medical History  Patient summary reviewed  Chart reviewed  No history of anesthetic complications     Cardiovascular   Pulmonary  Smoker ex-smoker  , Asthma , well controlled/ stable ,        GI/Hepatic  Negative GI/hepatic ROS          Negative  ROS        Endo/Other  Negative endo/other ROS   Obesity (BMI 33)    GYN  Negative gynecology ROS          Hematology   Musculoskeletal    Comment: Displaced fx 4th MC right hand      Neurology  Negative neurology ROS      Psychology   Negative psychology ROS              Physical Exam    Airway    Mallampati score: II  TM Distance: >3 FB       Dental   Comment: Poor dentition,decayed teeth,     Cardiovascular  Rhythm: regular,     Pulmonary  Breath sounds clear to auscultation,     Other Findings        Anesthesia Plan  ASA Score- 2     Anesthesia Type- IV sedation with anesthesia and regional with ASA Monitors  Additional Monitors:   Airway Plan:         Plan Factors-    Induction- intravenous  Postoperative Plan- Plan for postoperative opioid use  Informed Consent- Anesthetic plan and risks discussed with patient  I personally reviewed this patient with the CRNA  Discussed and agreed on the Anesthesia Plan with the EVANGELISTA Guerra

## 2018-12-14 ENCOUNTER — HOSPITAL ENCOUNTER (OUTPATIENT)
Dept: RADIOLOGY | Facility: HOSPITAL | Age: 18
Setting detail: OUTPATIENT SURGERY
Discharge: HOME | End: 2018-12-14
Payer: OTHER MISCELLANEOUS

## 2018-12-14 ENCOUNTER — HOSPITAL ENCOUNTER (OUTPATIENT)
Facility: HOSPITAL | Age: 18
Setting detail: OUTPATIENT SURGERY
Discharge: HOME | End: 2018-12-14
Attending: SURGERY | Admitting: SURGERY
Payer: OTHER MISCELLANEOUS

## 2018-12-14 ENCOUNTER — ANESTHESIA (OUTPATIENT)
Dept: PERIOP | Facility: HOSPITAL | Age: 18
End: 2018-12-14
Payer: OTHER MISCELLANEOUS

## 2018-12-14 VITALS
BODY MASS INDEX: 33.86 KG/M2 | DIASTOLIC BLOOD PRESSURE: 78 MMHG | WEIGHT: 250 LBS | HEIGHT: 72 IN | TEMPERATURE: 99.3 F | RESPIRATION RATE: 18 BRPM | HEART RATE: 88 BPM | OXYGEN SATURATION: 99 % | SYSTOLIC BLOOD PRESSURE: 126 MMHG

## 2018-12-14 VITALS — OXYGEN SATURATION: 99 % | SYSTOLIC BLOOD PRESSURE: 128 MMHG | DIASTOLIC BLOOD PRESSURE: 80 MMHG | HEART RATE: 88 BPM

## 2018-12-14 DIAGNOSIS — S62.314S: ICD-10-CM

## 2018-12-14 PROCEDURE — 73130 X-RAY EXAM OF HAND: CPT

## 2018-12-14 PROCEDURE — C1713 ANCHOR/SCREW BN/BN,TIS/BN: HCPCS | Performed by: SURGERY

## 2018-12-14 PROCEDURE — 26615 TREAT METACARPAL FRACTURE: CPT | Performed by: SURGERY

## 2018-12-14 DEVICE — 1.5MM VAL Y-PLATE 3 HOLES HD-7 HOLES SHAFT: Type: IMPLANTABLE DEVICE | Site: HAND | Status: FUNCTIONAL

## 2018-12-14 DEVICE — 1.5MM CORTEX SCREW SLF-TPNG WITH T4 STARDRIVE RECESS 11MM: Type: IMPLANTABLE DEVICE | Site: HAND | Status: FUNCTIONAL

## 2018-12-14 DEVICE — 1.5MM CORTEX SCREW SLF-TPNG WITH T4 STARDRIVE RECESS 9MM: Type: IMPLANTABLE DEVICE | Site: HAND | Status: FUNCTIONAL

## 2018-12-14 DEVICE — 1.5MM CORTEX SCREW SLF-TPNG WITH T4 STARDRIVE RECESS 10MM: Type: IMPLANTABLE DEVICE | Site: HAND | Status: FUNCTIONAL

## 2018-12-14 DEVICE — 1.5MM CORTEX SCREW SLF-TPNG WITH T4 STARDRIVE RECESS 12MM: Type: IMPLANTABLE DEVICE | Site: HAND | Status: FUNCTIONAL

## 2018-12-14 DEVICE — 1.5MM CORTEX SCREW SLF-TPNG WITH T4 STARDRIVE RECESS 14MM: Type: IMPLANTABLE DEVICE | Site: HAND | Status: FUNCTIONAL

## 2018-12-14 DEVICE — 1.3MM LOCKING SCREW SLF-TPNG WITH T4 STARDRIVE RECESS 8MM: Type: IMPLANTABLE DEVICE | Site: HAND | Status: FUNCTIONAL

## 2018-12-14 RX ORDER — FENTANYL CITRATE 50 UG/ML
INJECTION, SOLUTION INTRAMUSCULAR; INTRAVENOUS AS NEEDED
Status: DISCONTINUED | OUTPATIENT
Start: 2018-12-14 | End: 2018-12-14 | Stop reason: SURG

## 2018-12-14 RX ORDER — LIDOCAINE HYDROCHLORIDE 10 MG/ML
INJECTION, SOLUTION INFILTRATION; PERINEURAL AS NEEDED
Status: DISCONTINUED | OUTPATIENT
Start: 2018-12-14 | End: 2018-12-14 | Stop reason: SURG

## 2018-12-14 RX ORDER — FENTANYL CITRATE/PF 50 MCG/ML
50 SYRINGE (ML) INJECTION
Status: DISCONTINUED | OUTPATIENT
Start: 2018-12-14 | End: 2018-12-14 | Stop reason: HOSPADM

## 2018-12-14 RX ORDER — ONDANSETRON 2 MG/ML
4 INJECTION INTRAMUSCULAR; INTRAVENOUS ONCE AS NEEDED
Status: DISCONTINUED | OUTPATIENT
Start: 2018-12-14 | End: 2018-12-14 | Stop reason: HOSPADM

## 2018-12-14 RX ORDER — SODIUM CHLORIDE, SODIUM LACTATE, POTASSIUM CHLORIDE, CALCIUM CHLORIDE 600; 310; 30; 20 MG/100ML; MG/100ML; MG/100ML; MG/100ML
50 INJECTION, SOLUTION INTRAVENOUS CONTINUOUS
Status: DISCONTINUED | OUTPATIENT
Start: 2018-12-14 | End: 2018-12-14 | Stop reason: HOSPADM

## 2018-12-14 RX ORDER — LIDOCAINE HYDROCHLORIDE 10 MG/ML
0.5 INJECTION, SOLUTION INFILTRATION; PERINEURAL ONCE AS NEEDED
Status: COMPLETED | OUTPATIENT
Start: 2018-12-14 | End: 2018-12-14

## 2018-12-14 RX ORDER — MAGNESIUM HYDROXIDE 1200 MG/15ML
LIQUID ORAL AS NEEDED
Status: DISCONTINUED | OUTPATIENT
Start: 2018-12-14 | End: 2018-12-14 | Stop reason: HOSPADM

## 2018-12-14 RX ORDER — MEPERIDINE HYDROCHLORIDE 25 MG/ML
12.5 INJECTION INTRAMUSCULAR; INTRAVENOUS; SUBCUTANEOUS ONCE AS NEEDED
Status: DISCONTINUED | OUTPATIENT
Start: 2018-12-14 | End: 2018-12-14 | Stop reason: HOSPADM

## 2018-12-14 RX ORDER — PROPOFOL 10 MG/ML
INJECTION, EMULSION INTRAVENOUS CONTINUOUS PRN
Status: DISCONTINUED | OUTPATIENT
Start: 2018-12-14 | End: 2018-12-14 | Stop reason: SURG

## 2018-12-14 RX ORDER — PROPOFOL 10 MG/ML
INJECTION, EMULSION INTRAVENOUS AS NEEDED
Status: DISCONTINUED | OUTPATIENT
Start: 2018-12-14 | End: 2018-12-14 | Stop reason: SURG

## 2018-12-14 RX ORDER — SODIUM CHLORIDE, SODIUM LACTATE, POTASSIUM CHLORIDE, CALCIUM CHLORIDE 600; 310; 30; 20 MG/100ML; MG/100ML; MG/100ML; MG/100ML
125 INJECTION, SOLUTION INTRAVENOUS CONTINUOUS
Status: DISCONTINUED | OUTPATIENT
Start: 2018-12-14 | End: 2018-12-14 | Stop reason: HOSPADM

## 2018-12-14 RX ORDER — MIDAZOLAM HYDROCHLORIDE 1 MG/ML
INJECTION INTRAMUSCULAR; INTRAVENOUS AS NEEDED
Status: DISCONTINUED | OUTPATIENT
Start: 2018-12-14 | End: 2018-12-14 | Stop reason: SURG

## 2018-12-14 RX ORDER — CEFAZOLIN SODIUM 2 G/50ML
SOLUTION INTRAVENOUS AS NEEDED
Status: DISCONTINUED | OUTPATIENT
Start: 2018-12-14 | End: 2018-12-14 | Stop reason: SURG

## 2018-12-14 RX ADMIN — PROPOFOL 80 MG: 10 INJECTION, EMULSION INTRAVENOUS at 10:44

## 2018-12-14 RX ADMIN — SODIUM CHLORIDE, SODIUM LACTATE, POTASSIUM CHLORIDE, AND CALCIUM CHLORIDE 125 ML/HR: .6; .31; .03; .02 INJECTION, SOLUTION INTRAVENOUS at 08:49

## 2018-12-14 RX ADMIN — LIDOCAINE HYDROCHLORIDE 0.5 ML: 10 INJECTION, SOLUTION INFILTRATION; PERINEURAL at 08:50

## 2018-12-14 RX ADMIN — PROPOFOL 50 MG: 10 INJECTION, EMULSION INTRAVENOUS at 10:40

## 2018-12-14 RX ADMIN — MIDAZOLAM 2 MG: 1 INJECTION INTRAMUSCULAR; INTRAVENOUS at 09:48

## 2018-12-14 RX ADMIN — PROPOFOL 100 MCG/KG/MIN: 10 INJECTION, EMULSION INTRAVENOUS at 10:41

## 2018-12-14 RX ADMIN — CEFAZOLIN SODIUM 2000 MG: 2 SOLUTION INTRAVENOUS at 10:41

## 2018-12-14 RX ADMIN — LIDOCAINE HYDROCHLORIDE 50 MG: 10 INJECTION, SOLUTION INFILTRATION; PERINEURAL at 10:40

## 2018-12-14 RX ADMIN — FENTANYL CITRATE 50 MCG: 50 INJECTION, SOLUTION INTRAMUSCULAR; INTRAVENOUS at 09:49

## 2018-12-14 RX ADMIN — FENTANYL CITRATE 50 MCG: 50 INJECTION, SOLUTION INTRAMUSCULAR; INTRAVENOUS at 10:40

## 2018-12-14 NOTE — H&P (VIEW-ONLY)
Janny LINO  Attending, Orthopaedic Surgery  Hand, Wrist, and Elbow Surgery  [facility]      ORTHOPAEDIC HAND, WRIST, AND ELBOW OFFICE  VISIT       ASSESSMENT/PLAN:      Patient will be scheduled for right 4th finger metacarpal open reduction  Patient was placed in a splint today  Follow up PO     The patient verbalized understanding of exam findings and treatment plan  We engaged in the shared decision-making process and treatment options were discussed at length with the patient  Surgical and conservative management discussed today along with risks and benefits  Diagnoses and all orders for this visit:    Pain of finger of right hand  -     Cancel: XR finger right fourth digit-ring; Future    Displaced fracture of base of fourth metacarpal bone, right hand, sequela  -     Splint application  -     Case request operating room: OPEN REDUCTION W/ INTERNAL FIXATION (ORIF) HAND, 4th metacarpal; Standing  -     Case request operating room: OPEN REDUCTION W/ INTERNAL FIXATION (ORIF) HAND, 4th metacarpal    Other orders  -     Void on call to OR; Standing  -     Insert peripheral IV; Standing  -     Diet NPO; Sips with meds; Standing        Follow Up:  No Follow-up on file  To Do Next Visit:  Re-evaluation of current issue and X-rays of the  right  hand      General Discussions:      Operative Discussions:  Fracture Operative Treatment: The physiology of a fractured bone was discussed with the patient today  With displaced fractures, operative treatment often results in a functional recovery  Typically, these fractures undergo reduction either through percutaneous or open methods depending on the location and fracture pattern  Radiographs are typically taken at intervals throughout the fracture healing ensure maintenance of reduction and alignment  If the fracture loses its alignment, revision surgery may be required    Medical conditions such as diabetes, osteoporosis, vitamin D deficiency, and a history of or exposure to smoking may delay or prevent fracture healing  The risks and benefits of the procedure were explained to the patient, which include, but are not limited to: Bleeding, infection, recurrence, pain, scar, malunion, nonunion, damage to tendons, damage to nerves, and damage to blood vessels, and complications related to anesthesia, failure to give desire result, need for more surgery  These risks, along with alternative conservative treatment options, and postoperative protocols were voiced back and understood by the patient  All questions were answered to the patient's satisfaction  The patient agrees to comply with a standard postoperative protocol, and is willing to proceed  Education was provided via written and auditory forms  There were no barriers to learning  Written handouts regarding wound care, incision and scar care, and general preoperative information was provided to the patient  Prior to surgery, the patient may be requested to stop all anti-inflammatory medications  Prophylactic aspirin, Plavix, and Coumadin may be allowed to be continued  Medications including vitamin E , ginkgo, and fish oil are requested to be stopped approximately one week prior to surgery  Hypertensive medications and beta blockers, if taken, should be continued  ____________________________________________________________________________________________________________________________________________      CHIEF COMPLAINT:  Chief Complaint   Patient presents with    Right Hand - Pain       SUBJECTIVE:  Felix Bhakta is a 19y o  year old RHD male who presents right hand pain  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 , and he removing metal sheet and when he was pushing the metal sheet his right ring  finger got stuck in the netting and the finger went  to the right   He notes he went to urgent care and was prescribed anti inflammatories and was placed in a splint and ace wrap  He notes there is some mild swelling,  pain in the right ring finger with movement  He denies any numbness tingling  He is currently not taking any medications  Pain/symptom timing:  Worse during the day when active  Pain/symptom context:  Worse with activites and work  Pain/symptom modifying factors:  Rest makes better, activities make worse  Pain/symptom associated signs/symptoms: none    Prior treatment   · NSAIDsYes   · Injections Yes and Not Asked   · Bracing/Orthotics Yes    Physical Therapy Yes     I have personally reviewed all the relevant PMH, PSH, SH, FH, Medications and allergies      PAST MEDICAL HISTORY:  Past Medical History:   Diagnosis Date    Asthma        PAST SURGICAL HISTORY:  Past Surgical History:   Procedure Laterality Date    CIRCUMCISION  2010       FAMILY HISTORY:  History reviewed  No pertinent family history  SOCIAL HISTORY:  Social History   Substance Use Topics    Smoking status: Current Every Day Smoker     Packs/day: 0 20     Types: Cigarettes    Smokeless tobacco: Never Used    Alcohol use No       MEDICATIONS:    Current Outpatient Prescriptions:     clove oil, Apply topically as needed for mucositis, Disp: 3 5 mL, Rfl: 0    ibuprofen (MOTRIN) 600 mg tablet, Take 1 tablet by mouth every 6 (six) hours as needed for mild pain, Disp: 30 tablet, Rfl: 0    morphine (MSIR) 30 MG tablet, Take 1 tablet by mouth every 4 (four) hours as needed for severe pain for up to 5 doses Max Daily Amount: 180 mg, Disp: 5 tablet, Rfl: 0    naproxen (NAPROSYN) 500 mg tablet, Take 1 tablet by mouth 2 (two) times a day with meals (Patient not taking: Reported on 12/6/2018 ), Disp: 20 tablet, Rfl: 0    ALLERGIES:  Allergies   Allergen Reactions    Seasonal Ic  [Cholestatin]            REVIEW OF SYSTEMS:  Review of Systems   Constitutional: Negative for activity change, chills, fever and unexpected weight change     HENT: Negative for trouble swallowing and voice change  Eyes: Negative for pain and visual disturbance  Respiratory: Negative for shortness of breath and wheezing  Cardiovascular: Negative for chest pain, palpitations and leg swelling  Gastrointestinal: Negative for abdominal pain, nausea and vomiting  Endocrine: Negative for polydipsia and polyuria  Genitourinary: Negative for dysuria and hematuria  Musculoskeletal: Positive for arthralgias  Negative for myalgias  Skin: Negative for rash and wound  Neurological: Negative for dizziness, numbness and headaches  Psychiatric/Behavioral: Negative for decreased concentration and suicidal ideas  The patient is not nervous/anxious  VITALS:  Vitals:    12/06/18 0852   BP: 135/82   Pulse: 69       LABS:  HgA1c: No results found for: HGBA1C  BMP: No results found for: GLUCOSE, CALCIUM, NA, K, CO2, CL, BUN, CREATININE    _____________________________________________________  PHYSICAL EXAMINATION:  General: well developed and well nourished, alert, oriented times 3 and appears comfortable  Psychiatric: Normal  HEENT: Normocephalic, Atraumatic Trachea Midline, No torticollis  Pulmonary: No audible wheezing or respiratory distress   Cardiovascular: No pitting edema, 2+ radial pulse   Skin: No masses, erthema, lacerations, fluctation, ulcerations  Neurovascular: Sensation Intact to the Median, Ulnar, Radial Nerve, Motor Intact to the Median, Ulnar, Radial Nerve and Pulses Intact  Musculoskeletal: Normal, except as noted in detailed exam and in HPI        MUSCULOSKELETAL EXAMINATION:  Right hand  Mild swelling   For shortened metacarpal  Extension lag at the MP joint of approximately 20°  Able to make a fist    Tenderness palpation at fracture site no malrotation noted    ___________________________________________________  STUDIES REVIEWED:  I have personally reviewed AP lateral and oblique radiographs of right hand   which demonstrates oblique fracture 4th metacarpal metaphysis extending into the base          PROCEDURES PERFORMED:  Splint application  Date/Time: 12/6/2018 9:43 AM  Performed by: Luis Ruiz by: Joe Valles     Consent:     Consent obtained:  Verbal    Consent given by:  Patient  Procedure details:     Laterality:  Right    Location:  Hand    Hand:  R hand    Strapping: no  Cast type:  Short arm    Splint type:  Short arm splint, static (forearm to hand)    Supplies:  Cotton padding, plaster and elastic bandage          _____________________________________________________      Cristy Quiles    I,:   Jessica Wu am acting as a scribe while in the presence of the attending physician :        I,:   Tamia Alexis MD personally performed the services described in this documentation    as scribed in my presence :                    Attestation signed by Tamia Alexis MD at 12/6/2018 11:36 AM:  I saw and examined the patient personally and agree with my physician extender's note and plan   I formulated  the plan and gave  explicit instructions to the patient

## 2018-12-14 NOTE — ANESTHESIA PROCEDURE NOTES
Peripheral Block    Patient location during procedure: holding area  Start time: 12/14/2018 9:57 AM  Reason for block: at surgeon's request  Staffing  Anesthesiologist: Mica Leaks  Performed: anesthesiologist   Preanesthetic Checklist  Completed: patient identified, site marked, surgical consent, pre-op evaluation, timeout performed, IV checked, risks and benefits discussed and monitors and equipment checked  Peripheral Block  Patient position: supine  Prep: ChloraPrep  Patient monitoring: continuous pulse ox and frequent blood pressure checks  Block type: supraclavicular  Laterality: right  Injection technique: single-shot  Procedures: ultrasound guided  Ultrasound permanent image saved  Local infiltration: ropivacaine (1:400K Epi)  Infiltration strength: 0 5 %  Dose: 30 mL  Needle  Needle type: Stimuplex   Needle gauge: 22 G  Needle localization: ultrasound guidance  Assessment  Injection assessment: incremental injection, local visualized surrounding nerve on ultrasound, negative aspiration for CSF, negative aspiration for heme and transient paresthesias  Paresthesia pain: immediately resolved  Heart rate change: no  Slow fractionated injection: yes  Post-procedure:  site cleaned  patient tolerated the procedure well with no immediate complications  Additional Notes  Pt seen/evaluated, chart reviewed & pt deemed appropriate for procedure immediately prior

## 2018-12-14 NOTE — DISCHARGE INSTRUCTIONS
Post Operative Instructions    You have had surgery on your arm today, please read and follow the information below:  · Elevate your hand above your elbow during the next 24-48 hours to help with swelling  · Place your hand and arm over your head with motion at your shoulder three times a day  · Do not apply any cream/ointment/oil to your incisions including antibiotics  · Do not soak your hands in standing water (dishwater, tubs, Jacuzzi's, pools, etc ) until given permission (typically 2-3 weeks after injury)    Call the office if you notice any:  · Increased numbness or tingling of your hand or fingers that is not relieved with elevation  · Increasing pain that is not controlled with medication  · Difficulty chewing, breathing, swallowing  · Chest pains or shortness of breath  · Fever over 101 4 degrees  Bandage: Do NOT remove bandage until follow-up appointment  Motion: Move fingers into a fist 5 times a day, DO NOT move any splinted fingers  Weight bearing status: Avoid heavy lifting (>5 pounds) with the extremity that was operated on until follow up appointment  Normal activities of daily living are OK  Ice: Ice for 10 minutes every hour as needed for swelling x 24 hours  Sling: Sling for comfort for 2-3 days  Pain medication:   Naproxen 220 mg two times a day   Tylenol Extended Release 650 mg every 8 hours  Norco/Hydrocodone one tab every 6 hours AS NEEDED for pain     Follow-up Appointment: 10-14 days  12/28/18 with BEATRIS Rivas       Please call the office if you have any questions or concerns regarding your post-operative care

## 2018-12-14 NOTE — OP NOTE
OPERATIVE REPORT  PATIENT NAME: Juanita Coreas  :  2000  MRN: [id number]  Pt Location: [facility]    SURGERY DATE: 18    Surgeon(s) and Role:     * Silvestre Boone MD - Primary     * Enos Lesch, MD - Assisting    Pre-Op Diagnosis:  Displaced fracture of base of fourth metacarpal bone, right hand, sequela [S62 314S]    Post-Op Diagnosis Codes:     * Displaced fracture of base of fourth metacarpal bone, right hand, sequela [S62 314S]    Procedure(s):  OPEN REDUCTION W/ INTERNAL FIXATION (ORIF) HAND, 4th metacarpal (Right)    Specimen(s):  * No orders in the log *    Estimated Blood Loss:   Minimal    Anesthesia Type:   Regional with Sedation    IMPLANTS:    Implant Name Type Inv  Item Serial No   Lot No  LRB No  Used   LOG [id number] - [employer] MODULAR MINI FRAG LCP SYSTEM - 1          LOG [id number] - [employer] MODULAR HAND SYSTEM TITANIUM IMPLANTS AND INSTRUMENTS - 1          PLATE 5 7VX LEN Y 3HL HD 7HL SHFT - [id number]  PLATE 7 9KJ LEN Y 3HL HD 7HL SHFT  Synthes  Right 1   SCREW LCK 1 3 X 8MM SLF TAP T4 STRDRV RECESS - [id number]  SCREW LCK 1 3 X 8MM SLF TAP T4 STRDRV RECESS  Synthes  Right 1   SCREW CRTX 1 5 X 9MM T4 STRDRV RECES SLF TAP - [id number]  SCREW CRTX 1 5 X 9MM T4 STRDRV RECES SLF TAP  Synthes  Right 1   SCREW CRTX 1 5 X 10MM T4 STRDRV RECES SLF TAP - [id number]  SCREW CRTX 1 5 X 10MM T4 STRDRV RECES SLF TAP  Synthes  Right 2   SCREW CRTX 1 5 X 11MM T4 STRDRV RECES SLF TAP - [id number]  SCREW CRTX 1 5 X 11MM T4 STRDRV RECES SLF TAP  Synthes  Right 2   SCREW CRTX 1 5 X 12MM T4 STRDRV RECES SLF TAP - [id number]  SCREW CRTX 1 5 X 12MM T4 STRDRV RECES SLF TAP  Synthes  Right 1   SCREW CRTX 1 5 X 14MM T4 STRDRV RECES SLF TAP - [id number]   SCREW CRTX 1 5 X 14MM T4 STRDRV RECES SLF TAP   Synthes   Right 1           Antibiotics: 2 grams ancef     Operative Indications: The patient has a history of right 4th metacarpal fracture that was recalcitrant to conservative management    The decision was made to bring the patient to the operating room for right 4th metacarpal open reduction internal fixation  Risks of the procedure were explained which include, but are not limited to bleeding; infection; damage to nerves, arteries,veins, tendons; scar; pain; need for reoperation; failure to give desired result; and risks of anaesthesia  All questions were answered to satisfaction and they were willing to proceed  Operative Findings:  Interposed muscle within the fracture site    Complications:   None    Procedure and Technique:  After the patient, site, and procedure were identified, the patient was brought into the operating room in a supine position  Regional anaesthesia and sedation were provided  A well padded tourniquet was applied to the extremity, set at 250 mmHg  The  right upper extremity was then prepped and drapped in a normal, sterile, orthopedic fashion  A 5 cm longitudinal incision was made overlying the 4th metacarpal   Under loupe magnification branches of the ulnar dorsal sensory nerve were  identified and protected as were the superficial arteries and veins  the interval between the [facility] to the 4th and 5th digit was split longitudinally  Using the 64 blade the fascia overlying the 4th metacarpal was split and using a a Key elevator the periosteal flaps were raised  The fracture site, was identified irrigated, and debrided sharply using a curette and a 64 blade  It was noted there was interposed muscle within the fracture site Next the fracture was reduced using longitudinal traction as well as a point-to-point clamp across the fracture side  AP lateral oblique radiographs were taken to verify appropriate reduction  Following this a lag screw was drilled measured and placed across the fracture site  Next the radial hole of a    1 5 mm T plate was removed and it was  applied to the dorsal aspect of the metacarpal and pinned in place   Fluoroscopy was used to verify appropriate position  Distal and proximal screws were sequentially drilled measured and placed alternating between the proximal distal fragment while maintaining compression at the fracture site  8 screws were placed in total 3 proximal to the fracture site and 4  distal    Fracture reduction as well as hardware position and screw length was verified on AP lateral oblique radiographs and was found to be near anatomic with good hardware position  The wound was irrigated  The periosteum closed with a 4 0 Monocryl         At the completion of the procedure, hemostasis was obtained with cautery and direct pressure  The wounds were copiously irrigated with sterile solution  The wounds were closed with Vicryl, Monocryl and Steri-strips  Sterile dressings were applied, including Xeroform, gauze, tweeners, webril, ACE and ulnar gutter splint  Please note, all sponge, needle, and instrument counts were correct prior to closure  Loupe magnification was utilized  The patient tolerated the procedure well       I was present for the entire procedure    Patient Disposition:  PACU     SIGNATURE: Abebe Cabrera MD  DATE: 12/14/18  TIME: 12:32 PM

## 2018-12-14 NOTE — PROGRESS NOTES
Dr Vi Chopra to [address] to re-evaluate patient  Pt's mother Melany Dumont and pt's girlfriend Eleonora Urena at bedside

## 2018-12-28 ENCOUNTER — OFFICE VISIT (OUTPATIENT)
Dept: OBGYN CLINIC | Facility: CLINIC | Age: 18
End: 2018-12-28

## 2018-12-28 ENCOUNTER — OFFICE VISIT (OUTPATIENT)
Dept: OCCUPATIONAL THERAPY | Facility: CLINIC | Age: 18
End: 2018-12-28
Payer: OTHER MISCELLANEOUS

## 2018-12-28 ENCOUNTER — APPOINTMENT (OUTPATIENT)
Dept: RADIOLOGY | Facility: CLINIC | Age: 18
End: 2018-12-28
Payer: OTHER MISCELLANEOUS

## 2018-12-28 VITALS
BODY MASS INDEX: 31.97 KG/M2 | HEIGHT: 72 IN | HEART RATE: 78 BPM | WEIGHT: 236 LBS | DIASTOLIC BLOOD PRESSURE: 77 MMHG | SYSTOLIC BLOOD PRESSURE: 126 MMHG

## 2018-12-28 DIAGNOSIS — S62.314S: Primary | ICD-10-CM

## 2018-12-28 DIAGNOSIS — S62.314D: Primary | ICD-10-CM

## 2018-12-28 DIAGNOSIS — S62.314S: ICD-10-CM

## 2018-12-28 PROCEDURE — G8991 OTHER PT/OT GOAL STATUS: HCPCS | Performed by: OCCUPATIONAL THERAPIST

## 2018-12-28 PROCEDURE — L3906 WHO W/O JOINTS CF: HCPCS | Performed by: OCCUPATIONAL THERAPIST

## 2018-12-28 PROCEDURE — G8990 OTHER PT/OT CURRENT STATUS: HCPCS | Performed by: OCCUPATIONAL THERAPIST

## 2018-12-28 PROCEDURE — 73130 X-RAY EXAM OF HAND: CPT

## 2018-12-28 PROCEDURE — G8992 OTHER PT/OT  D/C STATUS: HCPCS | Performed by: OCCUPATIONAL THERAPIST

## 2018-12-28 PROCEDURE — 99024 POSTOP FOLLOW-UP VISIT: CPT | Performed by: ORTHOPAEDIC SURGERY

## 2018-12-28 NOTE — PROGRESS NOTES
Patient Name:  Kimberly Benitez  MRN:  [id number]    Assessment     1  Displaced fracture of base of fourth metacarpal bone, right hand, sequela  XR hand 3+ vw right       Plan     ORIF right fourth metacarpal fracture 12/14/18  1  Referral to occupational therapy for a custom forearm based ulnar gutter splint into initiate active range of motion  2  Continue nonweightbearing right upper extremity in splint  Splint may be removed only for occupational therapy  3  Follow-up in two weeks for repeat evaluation with Dr MAJO LONGORIA [facility]  4  Out of work until next evaluation  Note provided  Subjective     40-year-old male returns to the office today status post ORIF right fourth metacarpal fracture 12/14/18  Today he states his pain is well controlled  He denies any numbness and tingling  He does note some stiffness and swelling in the digits  No fever or chills  Objective     /77 (BP Location: Right arm, Patient Position: Sitting, Cuff Size: Standard)   Pulse 78   Ht 6' (1 829 m)   Wt 107 kg (236 lb)   BMI 32 01 kg/m²     Right hand:   Well-healed incision without erythema or drainage  Soft tissue swelling noted about the hand and digits  Tenderness to palpation noted fourth metacarpal   Digital range of motion intact without significant discomfort  Sensation intact median ulnar and radial nerves  2+ radial pulse  Data Review     I have personally reviewed pertinent films in PACS, and my interpretation follows  X-rays performed today of the right hand reveals stable intact orthopedic hardware in satisfactory alignment  Fracture remains well aligned

## 2018-12-28 NOTE — PROGRESS NOTES
Splint     Diagnosis:   1  Open displaced fracture of base of fourth metacarpal bone of right hand with routine healing, subsequent encounter       Indication: Fracture    Location: Right  ring finger  Supplies: Custom Fit Orthotic  Splint type: Ulnar Gutter Hand-Forearm based  Wearing Schedule: Remove for hygiene only  Describe Position: Forearm based in neutral, MPs in 50 degrees of flexion, Ips free  Precautions: Fracture    Patient or Caregiver expresses understanding of wearing Schedule and Precautions? Yes  Patient or Caregiver able to don/doff orthotic independently? Yes    Written orders provided to patient?  Yes  Orders Obtained: Written  Orders Obtained from: Ivan/Radha     Return for evaluation and treatment Yes, Kate Regalado

## 2018-12-28 NOTE — LETTER
December 28, 2018     Patient: Cody Roman   YOB: 2000   Date of Visit: 12/28/2018       To Whom it May Concern:    Martina Kaur is under my professional care  He was seen in my office on 12/28/2018  He is out of work until his next evaluation in two weeks  If you have any questions or concerns, please don't hesitate to call           Sincerely,          Quynh Paz PA-C

## 2019-01-03 ENCOUNTER — EVALUATION (OUTPATIENT)
Dept: OCCUPATIONAL THERAPY | Facility: CLINIC | Age: 19
End: 2019-01-03
Payer: OTHER MISCELLANEOUS

## 2019-01-03 DIAGNOSIS — S62.314S: ICD-10-CM

## 2019-01-03 PROCEDURE — 97010 HOT OR COLD PACKS THERAPY: CPT | Performed by: OCCUPATIONAL THERAPIST

## 2019-01-03 PROCEDURE — G8990 OTHER PT/OT CURRENT STATUS: HCPCS | Performed by: OCCUPATIONAL THERAPIST

## 2019-01-03 PROCEDURE — 97110 THERAPEUTIC EXERCISES: CPT | Performed by: OCCUPATIONAL THERAPIST

## 2019-01-03 PROCEDURE — G8991 OTHER PT/OT GOAL STATUS: HCPCS | Performed by: OCCUPATIONAL THERAPIST

## 2019-01-03 PROCEDURE — 97165 OT EVAL LOW COMPLEX 30 MIN: CPT | Performed by: OCCUPATIONAL THERAPIST

## 2019-01-03 NOTE — PROGRESS NOTES
Daily Note     Today's date: 1/3/2019  Patient name: Elaine Montemayor  : 2000  MRN: [id number]  Referring provider: Alvina Guy MD  Dx:   Encounter Diagnosis     ICD-10-CM    1  Displaced fracture of base of fourth metacarpal bone, right hand, sequela S62 314S Ambulatory referral to Occupational Therapy       Start Time: 0900          Subjective: See eval          Objective: See treatment diary below and attached eval        Assessment: Tolerated treatment well  Patient would benefit from continued OT      Plan: Continue per plan of care         Precautions: ORIF , no strength    Specialty Daily Treatment Diary     Manual  1/3       STM 3'                                           Exercise Diary  1/3       HEP: TG, wrist AROM Issued       Keypegs 1x       Marbles adduction RF/SF 20x       Tennis ball wall walking 10x                                                                                                                                           Modalities 1/3       MHP 10

## 2019-01-03 NOTE — PROGRESS NOTES
OT Evaluation     Today's date: 1/3/2019  Patient name: Rich Ferrera  : 2000  MRN: [id number]  Referring provider: Sesar Edwards PA-C  Dx:   Encounter Diagnosis     ICD-10-CM    1  Displaced fracture of base of fourth metacarpal bone, right hand, sequela S62 314S Ambulatory referral to Occupational Therapy                  Assessment  Assessment details: S/p R 4th metacarpal ORIF on   He is wearing a forearm based ulnar gutter orthotic  His AROM demonstrates minimal impairment currently and he is healing well  He also is experiencing minimal pain or edema  See below for a detailed assessment  Impairments: abnormal or restricted ROM, activity intolerance, impaired physical strength and pain with function    Symptom irritability: lowUnderstanding of Dx/Px/POC: excellent  Goals  STG: Patient will be compliant with post operative precautions (if applicable) and home exercise program in 2 weeks  STG: Pain will be reduced by 25% in 4 weeks  STG: Inflammation/edema/joint effusion will be reduced by 25% and patient will be independent with self management techniques in 4 weeks  STG: Range of motion of hand and wrist will be improved by 25% in 4 weeks  LTG: Range of motion of hand and wrist will be improved by 90% in 6 weeks  LTG: Strength will be improved by 50% in 6-8 weeks  LTG: Performance in ADLs and IADLS will be improved to prior level of function with the affected extremity within 6 weeks  LTG: Performance in work activity will be improved to prior level of function with the affected extremity within 6 weeks  LTG: FOTO score increase by 20 points within 6 weeks  Plan  Plan details: 1x a week until cleared for strengthening  Then can progress to 2x a week as needed  Treatment to include modalities, manual therapy, PRE's, HEP, and orthotics as appropriate     Patient would benefit from: skilled OT, OT eval and custom splinting  Planned modality interventions: thermotherapy: hydrocollator packs and thermotherapy: paraffin bath  Planned therapy interventions: functional ROM exercises, home exercise program, joint mobilization, manual therapy, therapeutic exercise and patient education  Frequency: 2x week  Duration in weeks: 6  Treatment plan discussed with: patient        Subjective Evaluation    History of Present Illness  Date of surgery: 2018  Mechanism of injury: surgery  Mechanism of injury: S/p R 4th metacarpal ORIF on   Injury occurred at work  Not a recurrent problem   Quality of life: excellent    Pain  At best pain ratin  At worst pain rating: 3  Quality: throbbing and dull ache    Social Support    Employment status: working  Hand dominance: right    Patient Goals  Patient goals for therapy: increased strength, decreased pain, increased motion and return to work          Objective     Observations     Additional Observation Details  Loss of knuckle contour RF and SF  No scar adhesion  Active Range of Motion     Right Wrist   Wrist flexion: 78 degrees   Wrist extension: 57 degrees     Left Digits    Extension   Ring     PIP: 19  Little     PIP: 18    Right Digits   Flexion   Ring     MCP: 50    PIP: 95    DIP: 72  Little     MCP: 51    PIP: 86    DIP: 81  Extension   Ring     MCP: -3    PIP: 15    DIP: 10  Little     MCP: -2    PIP: 13    DIP: -6    Additional Active Range of Motion Details  WF with DF=40    PIP hyperextension bilaterally  90% hook fist     Strength/Myotome Testing     Additional Strength Details  Loss of knuckle contour RF and SF  No scar adhesion       Swelling     Left Wrist/Hand   Ring     Proximal: 5 9 cm    Middle: 4 9 cm  Little     Proximal: 5 2 cm  Circumference MCP: 21 cm    Right Wrist/Hand   Ring     Proximal: 6 1 cm    Middle: 5 cm  Little     Proximal: 5 5 cm  Circumference MCP: 22 cm

## 2019-01-08 ENCOUNTER — OFFICE VISIT (OUTPATIENT)
Dept: OCCUPATIONAL THERAPY | Facility: CLINIC | Age: 19
End: 2019-01-08
Payer: OTHER MISCELLANEOUS

## 2019-01-08 DIAGNOSIS — S62.314S: Primary | ICD-10-CM

## 2019-01-08 DIAGNOSIS — S62.314D: ICD-10-CM

## 2019-01-08 PROCEDURE — 97110 THERAPEUTIC EXERCISES: CPT | Performed by: OCCUPATIONAL THERAPIST

## 2019-01-08 PROCEDURE — 97010 HOT OR COLD PACKS THERAPY: CPT | Performed by: OCCUPATIONAL THERAPIST

## 2019-01-08 NOTE — PROGRESS NOTES
Daily Note     Today's date: 2019  Patient name: Tariq Venegas  : 2000  MRN: [id number]  Referring provider: Tran Webb MD  Dx:   Encounter Diagnosis     ICD-10-CM    1  Displaced fracture of base of fourth metacarpal bone, right hand, sequela S62 314S    2  Open displaced fracture of base of fourth metacarpal bone of right hand with routine healing, subsequent encounter S62 314D                   Subjective: "it really doesn't hurt"         Objective: See treatment diary below and attached eval        Assessment: Tolerated treatment well  Patient would benefit from continued OT  Motion is WNL and there is no soft tissue tightness  Minimal scar tissue adhesion  Did well with initiation of PREs  Plan: Continue per plan of care   1x/week until cleared for strength      Precautions: ORIF , no strength    Specialty Daily Treatment Diary     Manual  1/3 1/8      STM 3' 5'      ROM  5'                                  Exercise Diary  1/3 1/8      HEP: TG, wrist AROM Issued       Keypegs 1x 1x      Marbles adduction RF/SF 20x       Tennis ball wall walking 10x 10x      Rubber bands on tennis ball   2x      Velcro pinch board  With SF/RF x1      Flex bar flex/extend  30x Red                                                                                                                   Modalities 1/3 1/8      MHP 10 10'

## 2019-01-15 ENCOUNTER — OFFICE VISIT (OUTPATIENT)
Dept: OCCUPATIONAL THERAPY | Facility: CLINIC | Age: 19
End: 2019-01-15
Payer: OTHER MISCELLANEOUS

## 2019-01-15 DIAGNOSIS — S62.314S: ICD-10-CM

## 2019-01-15 DIAGNOSIS — S62.314D: Primary | ICD-10-CM

## 2019-01-15 PROCEDURE — 97110 THERAPEUTIC EXERCISES: CPT | Performed by: OCCUPATIONAL THERAPIST

## 2019-01-15 PROCEDURE — 97010 HOT OR COLD PACKS THERAPY: CPT | Performed by: OCCUPATIONAL THERAPIST

## 2019-01-15 NOTE — PROGRESS NOTES
Daily Note     Today's date: 1/15/2019  Patient name: Ayesha Lima  : 2000  MRN: [id number]  Referring provider: Raissa Castrejon MD  Dx:   Encounter Diagnosis     ICD-10-CM    1  Open displaced fracture of base of fourth metacarpal bone of right hand with routine healing, subsequent encounter S62 314D    2  Displaced fracture of base of fourth metacarpal bone, right hand, sequela S62 314S                   Subjective: "it's been good"         Objective: See treatment diary below  Assessment: Tolerated treatment well  Patient would benefit from continued OT  WNL wrist and digit motion  Mild pain post PREs  He has a mild contact dermatitis from the use of orthotic  Plan: Continue per plan of care  1x/week until cleared for strength       Precautions: ORIF , no strength    Specialty Daily Treatment Diary     Manual  1/3 1/8 1/15     STM 3' 5' 3'     ROM  5' 2'                                 Exercise Diary  1/3 1/8 1/15     HEP: TG, wrist AROM Issued       Keypegs 1x 1x 1x     Marbles adduction RF/SF 20x       Tennis ball wall walking 10x 10x Green ball 10x     Rubber bands on tennis ball   2x      Velcro pinch board  With SF/RF x1 With SF/RF x1     Flex bar flex/extend  30x Red  Green 30x     Pegs    With red clothes pin, out with 3rd      Extension web    Yellow 3x10, Orange 3x10      Digiflex    Red 2x10                                                                                          Modalities 1/3 1/8 1/15     MHP 10 10' 10'

## 2019-01-16 ENCOUNTER — OFFICE VISIT (OUTPATIENT)
Dept: OBGYN CLINIC | Facility: CLINIC | Age: 19
End: 2019-01-16

## 2019-01-16 ENCOUNTER — APPOINTMENT (OUTPATIENT)
Dept: RADIOLOGY | Facility: CLINIC | Age: 19
End: 2019-01-16
Payer: OTHER MISCELLANEOUS

## 2019-01-16 VITALS
DIASTOLIC BLOOD PRESSURE: 82 MMHG | SYSTOLIC BLOOD PRESSURE: 145 MMHG | HEIGHT: 72 IN | HEART RATE: 89 BPM | BODY MASS INDEX: 31.97 KG/M2 | WEIGHT: 236 LBS

## 2019-01-16 DIAGNOSIS — Z98.890 S/P ORIF (OPEN REDUCTION INTERNAL FIXATION) FRACTURE: ICD-10-CM

## 2019-01-16 DIAGNOSIS — S62.314S: ICD-10-CM

## 2019-01-16 DIAGNOSIS — Z87.81 S/P ORIF (OPEN REDUCTION INTERNAL FIXATION) FRACTURE: ICD-10-CM

## 2019-01-16 DIAGNOSIS — S62.314S: Primary | ICD-10-CM

## 2019-01-16 PROCEDURE — 99024 POSTOP FOLLOW-UP VISIT: CPT | Performed by: SURGERY

## 2019-01-16 PROCEDURE — 73130 X-RAY EXAM OF HAND: CPT

## 2019-01-16 NOTE — PROGRESS NOTES
Assessment/Plan:  Patient ID: Stephanie Motley 19 y o  male   Surgery: OPEN REDUCTION W/ INTERNAL FIXATION (ORIF) HAND, 4th metacarpal - Right  Date of Surgery: 12/14/2018    1 months s/p ORIF 4th metacarpal  He may d/c the use of the brace  He should continue OT for ROM and strengthening exercises  We will see him back in the office in 4 weeks with repeat x-ray's  Work note provided today, for sedentary duty  No heavy lifting for the next 6 weeks  Follow Up:  4  week(s)    To Do Next Visit:  X-rays of the  right  hand      CHIEF COMPLAINT:  Chief Complaint   Patient presents with    Right Hand - Post-op         SUBJECTIVE:  Stephanie Motley is a 19y o  year old male who presents for follow up after OPEN REDUCTION W/ INTERNAL FIXATION (ORIF) HAND, 4th metacarpal - Right  Today patient has None and No Complaints  He has been wearing a ulnar gutter brace that was made by OT  He has been attending OT for ROM exercises  He notes some numbness around his incision site  He denies any pain, except for some soreness after OT         PHYSICAL EXAMINATION:  General: well developed and well nourished, alert, oriented times 3 and appears comfortable  Psychiatric: Normal    MUSCULOSKELETAL EXAMINATION:  Incision: Clean, dry, intact  Surgery Site: swelling present  Range of Motion: opposition intact and full composite fist possible  Neurovascular status: Neuro intact, good cap refill  Activity Restrictions: No restrictions      STUDIES REVIEWED:  Images were reviewd in PACS:   Well aligned 4th metacarpal base fracture with hardware in good position, some interval callus formation      PROCEDURES PERFORMED:  Procedures  No Procedures performed today       Scribe Attestation    I,:   Delfino Valdez am acting as a scribe while in the presence of the attending physician :        I,:   Tania Lara MD personally performed the services described in this documentation    as scribed in my presence :

## 2019-01-16 NOTE — LETTER
January 16, 2019     Patient: Miladys Rodriguez   YOB: 2000   Date of Visit: 1/16/2019       To Whom it May Concern:    Azul Braun is under my professional care  He was seen in my office on 1/16/2019  He may return to work with limitations sedentary duty, no lifting greater than 5 lbs with his RUE  If you have any questions or concerns, please don't hesitate to call           Sincerely,          Delmis Sanches MD

## 2019-01-23 ENCOUNTER — EVALUATION (OUTPATIENT)
Dept: OCCUPATIONAL THERAPY | Facility: CLINIC | Age: 19
End: 2019-01-23
Payer: OTHER MISCELLANEOUS

## 2019-01-23 DIAGNOSIS — Z87.81 S/P ORIF (OPEN REDUCTION INTERNAL FIXATION) FRACTURE: ICD-10-CM

## 2019-01-23 DIAGNOSIS — Z98.890 S/P ORIF (OPEN REDUCTION INTERNAL FIXATION) FRACTURE: ICD-10-CM

## 2019-01-23 DIAGNOSIS — S62.314S: ICD-10-CM

## 2019-01-23 PROCEDURE — 97110 THERAPEUTIC EXERCISES: CPT | Performed by: OCCUPATIONAL THERAPIST

## 2019-01-23 NOTE — PROGRESS NOTES
Daily Note     Today's date: 2019  Patient name: Elaine Montemayor  : 2000  MRN: [id number]  Referring provider: Alvina Guy MD  Dx:   Encounter Diagnosis     ICD-10-CM    1  Displaced fracture of base of fourth metacarpal bone, right hand, sequela S62 314S Ambulatory referral to PT/OT hand therapy   2  S/P ORIF (open reduction internal fixation) fracture Z96 7 Ambulatory referral to PT/OT hand therapy    Z87 81                   Subjective: "it's been good"         Objective: See treatment diary below and attached re-eval        Assessment: Tolerated treatment well  Patient would benefit from continued OT  Progressing with strengthening  Plan: Continue per plan of care       Precautions: ORIF , no strength    Specialty Daily Treatment Diary     Manual  1/3 1/8 1/15     STM 3' 5' 3'     ROM  5' 2'                                 Exercise Diary  1/3 1/8 1/15 1/23    HEP: TG, wrist AROM Issued       Keypegs 1x 1x 1x     Marbles adduction RF/SF 20x       Tennis ball wall walking 10x 10x Green ball 10x Green ball x10    Rubber bands on tennis ball   2x      Velcro pinch board  With SF/RF x1 With SF/RF x1     Flex bar flex/extend  30x Red  Green 30x Green 30x    Pegs    With red clothes pin, out with 3rd  Red in, 4th out     Extension web    Yellow 3x10, Orange 3x10  Yellow 30x, orange 30x    Digiflex    Red 2x10  Green 2x10     Theraputty HEP    Issued and performed     Jar turn in putty    Orange 10/10    Power web    Blue 3x10                                                                Modalities 1/3 1/8 1/15 1/23    MHP 10 10' 10' 5'

## 2019-01-23 NOTE — PROGRESS NOTES
OT Re-Evaluation     Today's date: 2019  Patient name: Stefani Melchor  : 2000  MRN: [id number]  Referring provider: Marybel Edmonds MD  Dx:   Encounter Diagnosis     ICD-10-CM    1  Displaced fracture of base of fourth metacarpal bone, right hand, sequela S62 314S Ambulatory referral to PT/OT hand therapy   2  S/P ORIF (open reduction internal fixation) fracture Z96 7 Ambulatory referral to PT/OT hand therapy    Z87 81                   Assessment  Assessment details: S/p R 4th metacarpal ORIF on   He demonstrates no ROM impairment at this time and is healing well  The ulnar gutter has been discharged  He also is experiencing minimal pain or edema  He will continue therapy with the focus on strengthening so he can return to work full duty  See below for a detailed assessment  Impairments: activity intolerance, impaired physical strength and pain with function    Symptom irritability: lowUnderstanding of Dx/Px/POC: excellent  Goals  STG: Patient will be compliant with post operative precautions (if applicable) and home exercise program in 2 weeks  - MET  STG: Pain will be reduced by 25% in 4 weeks  - PARTIALLY MET  STG: Inflammation/edema/joint effusion will be reduced by 25% and patient will be independent with self management techniques in 4 weeks  - PARTIALLY MET  STG: Range of motion of hand and wrist will be improved by 25% in 4 weeks  - MET    LTG: Range of motion of hand and wrist will be improved by 90% in 6 weeks  - MET  LTG: Strength will be improved by 50% in 6-8 weeks  - IN PROGRESS  LTG: Performance in ADLs and IADLS will be improved to prior level of function with the affected extremity within 6 weeks  - PARTIALLY MET  LTG: Performance in work activity will be improved to prior level of function with the affected extremity within 6 weeks - NOT MET  LTG: FOTO score increase by 20 points within 6 weeks   - PARTIALLY MET        Plan  Patient would benefit from: skilled OT, OT eval and custom splinting  Planned modality interventions: thermotherapy: hydrocollator packs and thermotherapy: paraffin bath  Planned therapy interventions: functional ROM exercises, home exercise program, joint mobilization, manual therapy, therapeutic exercise, patient education and strengthening  Frequency: 2x week  Duration in weeks: 6  Treatment plan discussed with: patient        Subjective Evaluation    History of Present Illness  Date of surgery: 2018  Mechanism of injury: surgery  Mechanism of injury: S/p R 4th metacarpal ORIF on   Injury occurred at work  Not a recurrent problem   Quality of life: excellent    Pain  At best pain ratin  At worst pain rating: 3 (Intermittent 7/10 pain )  Quality: throbbing and dull ache    Social Support    Employment status: working  Hand dominance: right    Patient Goals  Patient goals for therapy: increased strength, decreased pain, increased motion and return to work          Objective     Observations     Additional Observation Details  Loss of knuckle contour RF and SF  No scar adhesion  Active Range of Motion     Right Wrist   Wrist flexion: 78 degrees   Wrist extension: 65 degrees     Right Digits   Flexion   Little     MCP: 75    PIP: 90    DIP: 81    Additional Active Range of Motion Details  PIP hyperextension bilaterally  Strength/Myotome Testing     Left Wrist/Hand      (2nd hand position)     Trial 1: 75    Thumb Strength  Key/Lateral Pinch     [place] 1: 18  [place]/Three-Point Pinch     Trial 1: 12    Right Wrist/Hand      (2nd hand position)     Trial 1: 37    Thumb Strength   Key/Lateral Pinch     Trial 1: 20  Palmar/Three-Point Pinch     Trial 1: 13    Additional Strength Details  Loss of knuckle contour RF and SF  No scar adhesion       Swelling     Left Wrist/Hand   Circumference MCP: 21 cm    Right Wrist/Hand   Circumference MCP: 21 7 cm

## 2019-01-30 ENCOUNTER — OFFICE VISIT (OUTPATIENT)
Dept: OCCUPATIONAL THERAPY | Facility: CLINIC | Age: 19
End: 2019-01-30
Payer: OTHER MISCELLANEOUS

## 2019-01-30 DIAGNOSIS — Z98.890 S/P ORIF (OPEN REDUCTION INTERNAL FIXATION) FRACTURE: ICD-10-CM

## 2019-01-30 DIAGNOSIS — Z87.81 S/P ORIF (OPEN REDUCTION INTERNAL FIXATION) FRACTURE: ICD-10-CM

## 2019-01-30 DIAGNOSIS — S62.314S: Primary | ICD-10-CM

## 2019-01-30 DIAGNOSIS — S62.314D: ICD-10-CM

## 2019-01-30 PROCEDURE — 97110 THERAPEUTIC EXERCISES: CPT | Performed by: OCCUPATIONAL THERAPIST

## 2019-01-30 NOTE — PROGRESS NOTES
Daily Note     Today's date: 2019  Patient name: Cody Roman  : 2000  MRN: [id number]  Referring provider: Ekaterina So MD  Dx:   Encounter Diagnosis     ICD-10-CM    1  Displaced fracture of base of fourth metacarpal bone, right hand, sequela S62 314S    2  S/P ORIF (open reduction internal fixation) fracture Z96 7     Z87 81    3  Open displaced fracture of base of fourth metacarpal bone of right hand with routine healing, subsequent encounter S62 314D                   Subjective: "I think it's good"      Objective: See treatment diary below  Assessment: Tolerated treatment well  Patient would benefit from continued OT  No complaints with upgrades and no pain reported  One area of suture irritation  Plan: Continue per plan of care       Specialty Daily Treatment Diary     Manual  1/3 1/8 1/15     STM 3' 5' 3'     ROM  5' 2'                                 Exercise Diary  1/3 1/8 1/15 1/23 1/30   HEP: TG, wrist AROM Issued       Keypegs 1x 1x 1x     Marbles adduction RF/SF 20x       Tennis ball wall walking 10x 10x Green ball 10x Green ball x10    Rubber bands on tennis ball   2x      Velcro pinch board  With SF/RF x1 With SF/RF x1     Flex bar flex/extend  30x Red  Green 30x Green 30x Blue 30x    Pegs    With red clothes pin, out with 3rd  Red in, 4th out  Red in, 5th out    Extension web    Yellow 3x10, Orange 3x10  Yellow 30x, orange 30x Yellow 30x, orange 30   Digiflex    Red 2x10  Green 2x10  Green 2x10    Theraputty HEP    Issued and performed     Jar turn in putty    Orange 10/10 Orange 10/10   Power web    Blue 3x10 Blue 3x10, black 3x10    Rebounder throwing      50x   Items in blue putty      1x                                               Modalities 1/3 1/8 1/15 1/23 1/30   MHP 10 10' 10' 5' 5'

## 2019-02-06 ENCOUNTER — OFFICE VISIT (OUTPATIENT)
Dept: OCCUPATIONAL THERAPY | Facility: CLINIC | Age: 19
End: 2019-02-06
Payer: OTHER MISCELLANEOUS

## 2019-02-06 DIAGNOSIS — S62.314D: ICD-10-CM

## 2019-02-06 DIAGNOSIS — Z87.81 S/P ORIF (OPEN REDUCTION INTERNAL FIXATION) FRACTURE: Primary | ICD-10-CM

## 2019-02-06 DIAGNOSIS — Z98.890 S/P ORIF (OPEN REDUCTION INTERNAL FIXATION) FRACTURE: Primary | ICD-10-CM

## 2019-02-06 DIAGNOSIS — S62.314S: ICD-10-CM

## 2019-02-06 PROCEDURE — 97110 THERAPEUTIC EXERCISES: CPT

## 2019-02-06 PROCEDURE — 97010 HOT OR COLD PACKS THERAPY: CPT

## 2019-02-06 NOTE — PROGRESS NOTES
Daily Note     Today's date: 2019  Patient name: Allyson Elam  : 2000  MRN: [id number]  Referring provider: Reji Mathews MD  Dx:   Encounter Diagnosis     ICD-10-CM    1  S/P ORIF (open reduction internal fixation) fracture Z96 7     Z87 81    2  Displaced fracture of base of fourth metacarpal bone, right hand, sequela S62 314S    3  Open displaced fracture of base of fourth metacarpal bone of right hand with routine healing, subsequent encounter S62 314D                   Subjective: "It's my last day today "    Objective: See treatment diary below   strength 94 lbs (was 75 lbs on )    Assessment: Tolerated treatment well  Patient exhibited good technique with therapeutic exercises  Pt denies pain with functional use  Had 1 episode when he pushed open a door with the back of his hand, on the knuckle, where he had some tenderness  Otherwise, no pain  Plan: D/C to HEP      Specialty Daily Treatment Diary     Manual  2/6  1/15     STM   3'     ROM   2'                                 Exercise Diary  2/6  1/15 1/23 1/30   HEP: TG, wrist AROM        Keypegs   1x     Tennis ball wall walking Green ball 10x  Green ball 10x Green ball x10    Rubber bands on tennis ball         Velcro pinch board   With SF/RF x1     Flex bar flex/extend Blue 30x  Green 30x Green 30x Blue 30x    Pegs  green in 5th out  With red clothes pin, out with 3rd  Red in, 4th out  Red in, 5th out    Extension web  Yellow 30x/orange 30x  Yellow 3x10, Orange 3x10  Yellow 30x, orange 30x Yellow 30x, orange 30   Digiflex  blue 2x10  Red 2x10  Green 2x10  Green 2x10    Theraputty HEP    Issued and performed     Jar turn in putty Orange 10/10   Orange 10/10 Orange 10/10   Power web [employer] 30x/black 30x   Blue 3x10 Blue 3x10, black 3x10    Rebounder throwing  50x    50x   Items in blue putty  1x    1x                                               Modalities 2/6  1/15 1/23 1/30   MHP 10'  10' 5' 5'

## 2019-02-18 ENCOUNTER — APPOINTMENT (OUTPATIENT)
Dept: RADIOLOGY | Facility: CLINIC | Age: 19
End: 2019-02-18
Payer: OTHER MISCELLANEOUS

## 2019-02-18 VITALS
WEIGHT: 236 LBS | DIASTOLIC BLOOD PRESSURE: 94 MMHG | BODY MASS INDEX: 31.97 KG/M2 | SYSTOLIC BLOOD PRESSURE: 143 MMHG | HEIGHT: 72 IN | HEART RATE: 97 BPM

## 2019-02-18 DIAGNOSIS — Z87.81 S/P ORIF (OPEN REDUCTION INTERNAL FIXATION) FRACTURE: ICD-10-CM

## 2019-02-18 DIAGNOSIS — Z98.890 S/P ORIF (OPEN REDUCTION INTERNAL FIXATION) FRACTURE: Primary | ICD-10-CM

## 2019-02-18 DIAGNOSIS — Z98.890 S/P ORIF (OPEN REDUCTION INTERNAL FIXATION) FRACTURE: ICD-10-CM

## 2019-02-18 DIAGNOSIS — Z87.81 S/P ORIF (OPEN REDUCTION INTERNAL FIXATION) FRACTURE: Primary | ICD-10-CM

## 2019-02-18 DIAGNOSIS — S62.314S: ICD-10-CM

## 2019-02-18 PROCEDURE — 99024 POSTOP FOLLOW-UP VISIT: CPT | Performed by: SURGERY

## 2019-02-18 PROCEDURE — 73130 X-RAY EXAM OF HAND: CPT

## 2019-02-18 NOTE — LETTER
February 18, 2019     Patient: Brittni Murdock   YOB: 2000   Date of Visit: 2/18/2019       To Whom it May Concern:    Naveed Posada is under my professional care  He was seen in my office on 2/18/2019  He may return to work on 2/25/19 with no restrictions  Please allow frequent breaks every few hours for 10 min to allow hand to rest     If you have any questions or concerns, please don't hesitate to call           Sincerely,          Rosalva Fall MD        CC: No Recipients

## 2019-02-18 NOTE — PROGRESS NOTES
Assessment/Plan:  Patient ID: Carlos Lowery 19 y o  male   Surgery: OPEN REDUCTION W/ INTERNAL FIXATION (ORIF) HAND, 4th metacarpal - Right  Date of Surgery: 12/14/2018    Pt  May return to work on 2/25 with no restriction with frequent breaks     Follow Up:  3  month(s)    To Do Next Visit:         CHIEF COMPLAINT:  Chief Complaint   Patient presents with    Right Hand - Post-op         SUBJECTIVE:  Carlos Lowery is a 19y o  year old male who presents for follow up after OPEN REDUCTION W/ INTERNAL FIXATION (ORIF) HAND, 4th metacarpal - Right  Today patient has no pain  He not been able to return to work due to there not being light duty for him  He has been attending OT and has been discharged  He denies numbness and tingling  PHYSICAL EXAMINATION:  General: well developed and well nourished, alert, oriented times 3 and appears comfortable  Psychiatric: Normal    MUSCULOSKELETAL EXAMINATION:  Incision: healed   Surgery Site: normal, no signs of infection   Range of Motion: As expected  Neurovascular status: Neuro intact, good cap refill  Activity Restrictions: No restrictions         STUDIES REVIEWED:  Images reviewed in PACS: X-rays of the right fourth metacarpal shows healed fracture and well aligned hardware         PROCEDURES PERFORMED:  Procedures  No Procedures performed today    Scribe Attestation    I,:   Matt Palacios MA am acting as a scribe while in the presence of the attending physician :        I,:   Bozena Rouse MD personally performed the services described in this documentation    as scribed in my presence :

## 2019-02-18 NOTE — LETTER
February 18, 2019     Patient: Daisy Choi   YOB: 2000   Date of Visit: 2/18/2019       To Whom it May Concern:    Radha Mistryer is under my professional care  He was seen in my office on 2/18/2019  He may return to work on 2/25/19 with no restrictions  Please allow frequent breaks every few hours for 10 min to allow hand to rest           If you have any questions or concerns, please don't hesitate to call           Sincerely,          Sadie Pérez MD        CC: No Recipients

## 2019-02-25 NOTE — PROGRESS NOTES
OT Discharge     Today's date: 2019  Patient name: Jose Guerra  : 2000  MRN: [id number]  Referring provider: Dami Wells MD  Dx:   Encounter Diagnosis     ICD-10-CM    1  S/P ORIF (open reduction internal fixation) fracture Z96 7     Z87 81    2  Displaced fracture of base of fourth metacarpal bone, right hand, sequela S62 314S    3  Open displaced fracture of base of fourth metacarpal bone of right hand with routine healing, subsequent encounter S62 314D        Start Time: 900  Stop Time: 945  Total time in clinic (min): 45 minutes    Assessment    Symptom irritability: lowUnderstanding of Dx/Px/POC: excellent  Goals  STG: Patient will be compliant with post operative precautions (if applicable) and home exercise program in 2 weeks  - MET  STG: Pain will be reduced by 25% in 4 weeks  - MET  STG: Inflammation/edema/joint effusion will be reduced by 25% and patient will be independent with self management techniques in 4 weeks  - PARTIALLY MET  STG: Range of motion of hand and wrist will be improved by 25% in 4 weeks  - MET    LTG: Range of motion of hand and wrist will be improved by 90% in 6 weeks  - MET  LTG: Strength will be improved by 50% in 6-8 weeks  - MET  LTG: Performance in ADLs and IADLS will be improved to prior level of function with the affected extremity within 6 weeks  -  MET  LTG: Performance in work activity will be improved to prior level of function with the affected extremity within 6 weeks  -MET  LTG: FOTO score increase by 20 points within 6 weeks  - MET        Plan  Plan of Care beginning date: 2019  Plan of Care expiration date: 2019  Treatment plan discussed with: patient        Subjective Evaluation    History of Present Illness  Date of surgery: 2018  Mechanism of injury: surgery  Mechanism of injury: S/p R 4th metacarpal ORIF on   Injury occurred at work             Not a recurrent problem   Quality of life: excellent    Pain  At best pain rating: 0  At worst pain ratin    Social Support    Employment status: working  Hand dominance: right    Patient Goals  Patient goals for therapy: increased strength, decreased pain, increased motion and return to work          Objective     Observations     Additional Observation Details  Loss of knuckle contour RF and SF  No scar adhesion  Active Range of Motion     Right Wrist   Wrist flexion: 78 degrees   Wrist extension: 65 degrees     Right Digits   Flexion   Little     MCP: 75    PIP: 90    DIP: 81    Additional Active Range of Motion Details  PIP hyperextension bilaterally         Strength/Myotome Testing     Left Wrist/Hand      (2nd hand position)     Trial 1: 75    Right Wrist/Hand      (2nd hand position)     Trial 1: 94    Swelling     Left Wrist/Hand   Circumference MCP: 21 cm    Right Wrist/Hand   Circumference MCP: 21 7 cm

## 2019-04-20 ENCOUNTER — HOSPITAL ENCOUNTER (EMERGENCY)
Facility: HOSPITAL | Age: 19
Discharge: HOME | End: 2019-04-20
Admitting: EMERGENCY MEDICINE
Payer: COMMERCIAL

## 2019-04-20 VITALS
WEIGHT: 297.4 LBS | TEMPERATURE: 98.5 F | RESPIRATION RATE: 20 BRPM | SYSTOLIC BLOOD PRESSURE: 148 MMHG | BODY MASS INDEX: 40.33 KG/M2 | HEART RATE: 91 BPM | OXYGEN SATURATION: 98 % | DIASTOLIC BLOOD PRESSURE: 83 MMHG

## 2019-04-20 DIAGNOSIS — K08.9 POOR DENTITION: Primary | ICD-10-CM

## 2019-04-20 DIAGNOSIS — K04.7 TOOTH ABSCESS: ICD-10-CM

## 2019-04-20 DIAGNOSIS — R51.9 FACIAL PAIN: ICD-10-CM

## 2019-04-20 DIAGNOSIS — K08.89 TOOTHACHE: ICD-10-CM

## 2019-04-20 PROCEDURE — 99283 EMERGENCY DEPT VISIT LOW MDM: CPT

## 2019-04-20 PROCEDURE — 99283 EMERGENCY DEPT VISIT LOW MDM: CPT | Performed by: PHYSICIAN ASSISTANT

## 2019-04-20 RX ORDER — CLINDAMYCIN HYDROCHLORIDE 150 MG/1
450 CAPSULE ORAL EVERY 8 HOURS SCHEDULED
Qty: 63 CAPSULE | Refills: 0 | Status: SHIPPED | OUTPATIENT
Start: 2019-04-20 | End: 2019-04-27

## 2019-04-20 RX ORDER — IBUPROFEN 800 MG/1
800 TABLET ORAL EVERY 8 HOURS PRN
Qty: 21 TABLET | Refills: 0 | Status: SHIPPED | OUTPATIENT
Start: 2019-04-20 | End: 2019-04-27

## 2019-05-22 ENCOUNTER — HOSPITAL ENCOUNTER (OUTPATIENT)
Dept: RADIOLOGY | Facility: HOSPITAL | Age: 19
Discharge: HOME | End: 2019-05-22
Payer: COMMERCIAL

## 2019-05-22 VITALS
DIASTOLIC BLOOD PRESSURE: 86 MMHG | SYSTOLIC BLOOD PRESSURE: 133 MMHG | WEIGHT: 224 LBS | HEIGHT: 72 IN | BODY MASS INDEX: 30.34 KG/M2 | HEART RATE: 62 BPM

## 2019-05-22 DIAGNOSIS — Z98.890 S/P ORIF (OPEN REDUCTION INTERNAL FIXATION) FRACTURE: ICD-10-CM

## 2019-05-22 DIAGNOSIS — Z87.81 S/P ORIF (OPEN REDUCTION INTERNAL FIXATION) FRACTURE: ICD-10-CM

## 2019-05-22 DIAGNOSIS — S62.314S: Primary | ICD-10-CM

## 2019-05-22 DIAGNOSIS — S62.314S: ICD-10-CM

## 2019-05-22 PROCEDURE — 99212 OFFICE O/P EST SF 10 MIN: CPT | Performed by: SURGERY

## 2019-05-22 PROCEDURE — 73130 X-RAY EXAM OF HAND: CPT

## 2019-09-18 ENCOUNTER — HOSPITAL ENCOUNTER (EMERGENCY)
Facility: HOSPITAL | Age: 19
Discharge: HOME | End: 2019-09-18
Attending: EMERGENCY MEDICINE
Payer: COMMERCIAL

## 2019-09-18 VITALS
DIASTOLIC BLOOD PRESSURE: 70 MMHG | SYSTOLIC BLOOD PRESSURE: 125 MMHG | HEART RATE: 82 BPM | OXYGEN SATURATION: 98 % | TEMPERATURE: 98.4 F | BODY MASS INDEX: 39.66 KG/M2 | WEIGHT: 292.4 LBS | RESPIRATION RATE: 18 BRPM

## 2019-09-18 DIAGNOSIS — K04.7 DENTAL INFECTION: Primary | ICD-10-CM

## 2019-09-18 DIAGNOSIS — R22.0 FACIAL SWELLING: ICD-10-CM

## 2019-09-18 PROCEDURE — 99284 EMERGENCY DEPT VISIT MOD MDM: CPT | Performed by: PHYSICIAN ASSISTANT

## 2019-09-18 PROCEDURE — 99283 EMERGENCY DEPT VISIT LOW MDM: CPT

## 2019-09-18 RX ORDER — PENICILLIN V POTASSIUM 500 MG/1
500 TABLET ORAL 4 TIMES DAILY
Qty: 40 TABLET | Refills: 0 | Status: SHIPPED | OUTPATIENT
Start: 2019-09-18 | End: 2019-09-28

## 2019-09-18 RX ORDER — IBUPROFEN 800 MG/1
800 TABLET ORAL EVERY 6 HOURS PRN
Qty: 30 TABLET | Refills: 0 | Status: SHIPPED | OUTPATIENT
Start: 2019-09-18 | End: 2019-09-28

## 2019-09-18 NOTE — ED PROVIDER NOTES
History  Chief Complaint   Patient presents with    Facial Swelling     c/o right sided facial swelling and right upper jaw/teeth pain since yesterday  +patent airway  Pt admitted to "having alot of problems with my teeth"  17-year-old male presents to the emergency department with complaints of dental pain  States that he has had right-sided upper dental pain over the past several days with swelling of his face earlier this morning  States that he was previously seen by dentist who told him he needed to have all of his teeth extracted and have implants or dentures  States that he cannot afford to have this done  No recent dental checkups  History provided by:  Patient   used: No        Prior to Admission Medications   Prescriptions Last Dose Informant Patient Reported? Taking? Aspirin-Acetaminophen-Caffeine (EXCEDRIN PO)   Yes No   Sig: Take by mouth   HYDROcodone-acetaminophen (NORCO) 5-325 mg per tablet   No No   Sig: Take 1 tablet by mouth every 6 (six) hours as needed for pain Max Daily Amount: 4 tablets   Patient not taking: Reported on 2/18/2019   ibuprofen (MOTRIN) 800 mg tablet   No No   Sig: Take 1 tablet (800 mg total) by mouth every 8 (eight) hours as needed for mild pain for up to 7 days      Facility-Administered Medications: None       Past Medical History:   Diagnosis Date    Asthma     Right hand fracture        Past Surgical History:   Procedure Laterality Date    CIRCUMCISION  2010    OH OPEN TX METACARPAL FRACTURE SINGLE EA BONE Right 12/14/2018    Procedure: OPEN REDUCTION W/ INTERNAL FIXATION (ORIF) HAND, 4th metacarpal;  Surgeon: Vicky Bradshaw MD;  Location: [facility];  Service: Orthopedics       History reviewed  No pertinent family history  I have reviewed and agree with the history as documented      Social History     Tobacco Use    Smoking status: Current Every Day Smoker     Packs/day: 0 00     Types: E-Cigarettes    Smokeless tobacco: Never Used    Tobacco comment: couple years    Substance Use Topics    Alcohol use: No    Drug use: No        Review of Systems   Constitutional: Negative for chills and fever  HENT: Positive for dental problem and facial swelling  Negative for drooling, ear pain, sinus pressure, sneezing, sore throat and trouble swallowing  Eyes: Negative for pain  Respiratory: Negative for cough and chest tightness  Cardiovascular: Negative for chest pain  Skin: Negative for wound  All other systems reviewed and are negative  Physical Exam  Physical Exam   Constitutional: He is oriented to person, place, and time  Vital signs are normal  He appears well-developed and well-nourished  HENT:   Head: Normocephalic and atraumatic  Right Ear: Hearing, tympanic membrane, external ear and ear canal normal    Left Ear: Hearing, tympanic membrane, external ear and ear canal normal    Nose: Nose normal    Mouth/Throat: Uvula is midline and oropharynx is clear and moist  Dental caries present  No dental abscesses  Neck: Trachea normal and normal range of motion  Cardiovascular: Normal rate and regular rhythm  Exam reveals no gallop and no friction rub  No murmur heard  Pulmonary/Chest: Effort normal and breath sounds normal  No respiratory distress  He has no wheezes  He has no rales  Musculoskeletal: Normal range of motion  Neurological: He is alert and oriented to person, place, and time  Skin: Skin is warm and dry  Psychiatric: He has a normal mood and affect  His behavior is normal    Vitals reviewed        Vital Signs  ED Triage Vitals [09/18/19 0940]   Temperature Pulse Respirations Blood Pressure SpO2   98 4 °F (36 9 °C) 82 18 125/70 98 %      Temp src Heart Rate Source Patient Position - Orthostatic VS BP Location FiO2 (%)   -- Monitor Sitting Left arm --      Pain Score       5           Vitals:    09/18/19 0940   BP: 125/70   Pulse: 82   Patient Position - Orthostatic VS: Sitting Visual Acuity      ED Medications  Medications - No data to display    Diagnostic Studies  Results Reviewed     None                 No orders to display              Procedures  Procedures       ED Course                               MDM  Number of Diagnoses or Management Options  Dental infection:   Facial swelling:   Diagnosis management comments: Differential diagnosis includes but not limited to:  Dental pain, dental abscess, gingivitis        Disposition  Final diagnoses:   Dental infection   Facial swelling     Time reflects when diagnosis was documented in both MDM as applicable and the Disposition within this note     Time User Action Codes Description Comment    9/18/2019 10:38 AM Farida Gaviria Add [K04 7] Dental infection     9/18/2019 10:38 AM Farida Gaviria Add [R22 0] Facial swelling       ED Disposition     ED Disposition Condition Date/Time Comment    Discharge Stable Wed Sep 18, 2019 10:38 AM Lyn Nissen Repp discharge to home/self care              Follow-up Information     Follow up With Specialties Details Why Denzel  Schedule an appointment as soon as possible for a visit   [address]  [facility]  Schedule an appointment as soon as possible for a visit   [address]  [phone]          Discharge Medication List as of 9/18/2019 10:39 AM      START taking these medications    Details   penicillin V potassium (VEETID) 500 mg tablet Take 1 tablet (500 mg total) by mouth 4 (four) times a day for 10 days, Starting Wed 9/18/2019, Until Sat 9/28/2019, Normal         CONTINUE these medications which have CHANGED    Details   ibuprofen (MOTRIN) 800 mg tablet Take 1 tablet (800 mg total) by mouth every 6 (six) hours as needed for mild pain for up to 10 days, Starting Wed 9/18/2019, Until Sat 9/28/2019, Normal CONTINUE these medications which have NOT CHANGED    Details   Aspirin-Acetaminophen-Caffeine (EXCEDRIN PO) Take by mouth, Historical Med      HYDROcodone-acetaminophen (NORCO) 5-325 mg per tablet Take 1 tablet by mouth every 6 (six) hours as needed for pain Max Daily Amount: 4 tablets, Starting u 12/13/2018, Normal           No discharge procedures on file      ED Provider  Electronically Signed by           Brian Chaves PA-C  09/18/19 7888

## 2019-09-18 NOTE — ED NOTES
Pt seen, assessed and d/c by provider  Pt appeared to be in no acute distress upon discharge  Pt able to ambulate well without assistance upon exiting        Bianca Callejas RN  09/18/19 5160

## (undated) DEVICE — 1.5MM DRILL BIT/MQC FOR GLIDING HOLE/48MM

## (undated) DEVICE — DRAPE C-ARM X-RAY

## (undated) DEVICE — SPLINT 5 X 30 IN XFAST SET PLASTER

## (undated) DEVICE — DISPOSABLE EQUIPMENT COVER: Brand: SMALL TOWEL DRAPE

## (undated) DEVICE — CUFF TOURNIQUET 18 X 4 IN QUICK CONNECT DISP 1 BLADDER

## (undated) DEVICE — PADDING,UNDERCAST,COTTON, 4"X4YD STERILE: Brand: MEDLINE

## (undated) DEVICE — GLOVE SRG BIOGEL 7

## (undated) DEVICE — SPONGE PVP SCRUB WING STERILE

## (undated) DEVICE — GAUZE SPONGES,16 PLY: Brand: CURITY

## (undated) DEVICE — MINI BLADE ROUND TIP SHARP ON ONE SIDE

## (undated) DEVICE — SUT VICRYL 3-0 RB-1 18 IN J713D

## (undated) DEVICE — CURITY STRETCH BANDAGE: Brand: CURITY

## (undated) DEVICE — STERILE BETHLEHEM PLASTIC HAND: Brand: CARDINAL HEALTH

## (undated) DEVICE — 1.1MM DRILL BIT/MQC FOR THREADED HOLE/56MM

## (undated) DEVICE — SUT MONOCRYL 4-0 PS-2 18 IN Y496G

## (undated) DEVICE — GLOVE INDICATOR PI UNDERGLOVE SZ 7 BLUE

## (undated) DEVICE — INTENDED FOR TISSUE SEPARATION, AND OTHER PROCEDURES THAT REQUIRE A SHARP SURGICAL BLADE TO PUNCTURE OR CUT.: Brand: BARD-PARKER ® CARBON RIB-BACK BLADES

## (undated) DEVICE — CHLORAPREP HI-LITE 26ML ORANGE

## (undated) DEVICE — 3M™ STERI-STRIP™ REINFORCED ADHESIVE SKIN CLOSURES, R1546, 1/4 IN X 4 IN (6 MM X 100 MM), 10 STRIPS/ENVELOPE: Brand: 3M™ STERI-STRIP™

## (undated) DEVICE — ACE WRAP 3 IN VELCRO LATEX FREE

## (undated) DEVICE — OCCLUSIVE GAUZE STRIP,3% BISMUTH TRIBROMOPHENATE IN PETROLATUM BLEND: Brand: XEROFORM